# Patient Record
Sex: MALE | Race: WHITE | NOT HISPANIC OR LATINO | Employment: FULL TIME | URBAN - METROPOLITAN AREA
[De-identification: names, ages, dates, MRNs, and addresses within clinical notes are randomized per-mention and may not be internally consistent; named-entity substitution may affect disease eponyms.]

---

## 2017-02-03 ENCOUNTER — HISTORICAL (OUTPATIENT)
Dept: LAB | Facility: HOSPITAL | Age: 32
End: 2017-02-03

## 2017-02-03 LAB
CRP SERPL HS-MCNC: 2.02 MG/L (ref 0–3)
ERYTHROCYTE [SEDIMENTATION RATE] IN BLOOD: 2 MM/HR (ref 0–20)
RHEUMATOID FACT SERPL-ACNC: <10 IU/ML (ref 0–15)

## 2017-03-15 ENCOUNTER — HISTORICAL (OUTPATIENT)
Dept: LAB | Facility: HOSPITAL | Age: 32
End: 2017-03-15

## 2017-04-05 ENCOUNTER — HISTORICAL (OUTPATIENT)
Dept: LAB | Facility: HOSPITAL | Age: 32
End: 2017-04-05

## 2017-04-05 LAB
PTH-INTACT SERPL-MCNC: 47.6 PG/DL (ref 14–72)
RHEUMATOID FACT SERPL-ACNC: <10 IU/ML (ref 0–15)

## 2017-10-09 ENCOUNTER — HISTORICAL (OUTPATIENT)
Dept: LAB | Facility: HOSPITAL | Age: 32
End: 2017-10-09

## 2018-03-28 ENCOUNTER — HISTORICAL (OUTPATIENT)
Dept: ADMINISTRATIVE | Facility: HOSPITAL | Age: 33
End: 2018-03-28

## 2018-03-28 LAB
ALBUMIN SERPL-MCNC: 4.7 GM/DL (ref 3.4–5)
ALBUMIN/GLOB SERPL: 1.81 {RATIO} (ref 1.5–2.5)
ALP SERPL-CCNC: 79 UNIT/L (ref 38–126)
ALT SERPL-CCNC: 25 UNIT/L (ref 7–52)
AST SERPL-CCNC: 18 UNIT/L (ref 15–37)
BILIRUB SERPL-MCNC: 0.4 MG/DL (ref 0.2–1)
BILIRUBIN DIRECT+TOT PNL SERPL-MCNC: 0.1 MG/DL (ref 0–0.5)
BUN SERPL-MCNC: 10 MG/DL (ref 7–18)
CALCIUM SERPL-MCNC: 9.3 MG/DL (ref 8.5–10)
CHLORIDE SERPL-SCNC: 106 MMOL/L (ref 98–107)
CO2 SERPL-SCNC: 28 MMOL/L (ref 21–32)
CREAT SERPL-MCNC: 0.99 MG/DL (ref 0.6–1.3)
CREAT/UREA NIT SERPL: 10.1
GGT SERPL-CCNC: 70 UNIT/L (ref 5–85)
GLOBULIN SER-MCNC: 2.6 GM/DL (ref 1.2–3)
GLUCOSE SERPL-MCNC: 111 MG/DL (ref 74–106)
LDH SERPL-CCNC: 133 UNIT/L (ref 140–271)
POTASSIUM SERPL-SCNC: 4.1 MMOL/L (ref 3.5–5.1)
PROT SERPL-MCNC: 7.3 GM/DL (ref 6.4–8.2)
SODIUM SERPL-SCNC: 139 MMOL/L (ref 136–145)
T3FREE SERPL-MCNC: 3 PG/ML (ref 1.45–3.48)
T4 FREE SERPL-MCNC: 0.8 NG/DL (ref 0.76–1.46)
TSH SERPL-ACNC: 0.62 MIU/ML (ref 0.35–4.94)
URATE SERPL-MCNC: 6.8 MG/DL (ref 2.6–7.2)

## 2018-07-23 ENCOUNTER — HISTORICAL (OUTPATIENT)
Dept: ADMINISTRATIVE | Facility: HOSPITAL | Age: 33
End: 2018-07-23

## 2018-07-23 LAB
APPEARANCE, UA: CLEAR
BACTERIA #/AREA URNS AUTO: NORMAL /HPF
BILIRUB UR QL STRIP: NEGATIVE MG/DL
COLOR UR: YELLOW
GLUCOSE (UA): NEGATIVE MG/DL
HGB UR QL STRIP: NEGATIVE UNIT/L
KETONES UR QL STRIP: NEGATIVE MG/DL
LEUKOCYTE ESTERASE UR QL STRIP: NEGATIVE UNIT/L
NITRITE UR QL STRIP.AUTO: NEGATIVE
PH UR STRIP: 5.5 [PH]
PROT UR QL STRIP: NEGATIVE MG/DL
RBC #/AREA URNS HPF: NORMAL /HPF
SP GR UR STRIP: 1.01
SQUAMOUS EPITHELIAL, UA: NORMAL /LPF
UROBILINOGEN UR STRIP-ACNC: 0.2 MG/DL
WBC #/AREA URNS AUTO: NORMAL /[HPF]

## 2018-09-07 ENCOUNTER — HISTORICAL (OUTPATIENT)
Dept: ADMINISTRATIVE | Facility: HOSPITAL | Age: 33
End: 2018-09-07

## 2018-09-07 LAB
ABS NEUT (OLG): 2.2
ALBUMIN SERPL-MCNC: 4.5 GM/DL (ref 3.4–5)
ALBUMIN/GLOB SERPL: 1.67 {RATIO} (ref 1.5–2.5)
ALP SERPL-CCNC: 63 UNIT/L (ref 38–126)
ALT SERPL-CCNC: 52 UNIT/L (ref 7–52)
APPEARANCE, UA: CLEAR
AST SERPL-CCNC: 27 UNIT/L (ref 15–37)
BACTERIA #/AREA URNS AUTO: NORMAL /HPF
BILIRUB SERPL-MCNC: 0.4 MG/DL (ref 0.2–1)
BILIRUB UR QL STRIP: NEGATIVE MG/DL
BILIRUBIN DIRECT+TOT PNL SERPL-MCNC: 0.1 MG/DL (ref 0–0.5)
BILIRUBIN DIRECT+TOT PNL SERPL-MCNC: 0.3 MG/DL
BUN SERPL-MCNC: 10 MG/DL (ref 7–18)
CALCIUM SERPL-MCNC: 9.1 MG/DL (ref 8.5–10)
CHLORIDE SERPL-SCNC: 102 MMOL/L (ref 98–107)
CHOLEST SERPL-MCNC: 198 MG/DL (ref 0–200)
CHOLEST/HDLC SERPL: 3.1 {RATIO}
CO2 SERPL-SCNC: 30 MMOL/L (ref 21–32)
COLOR UR: YELLOW
CREAT SERPL-MCNC: 0.9 MG/DL (ref 0.6–1.3)
DEPRECATED CALCIDIOL+CALCIFEROL SERPL-MC: 28.2 NG/ML (ref 30–80)
ERYTHROCYTE [DISTWIDTH] IN BLOOD BY AUTOMATED COUNT: 12.3 % (ref 11.5–17)
GLOBULIN SER-MCNC: 2.7 GM/DL (ref 1.2–3)
GLUCOSE (UA): NEGATIVE MG/DL
GLUCOSE SERPL-MCNC: 110 MG/DL (ref 74–106)
HCT VFR BLD AUTO: 44.5 % (ref 42–52)
HDLC SERPL-MCNC: 64 MG/DL (ref 35–60)
HGB BLD-MCNC: 15 GM/DL (ref 14–18)
HGB UR QL STRIP: NEGATIVE UNIT/L
KETONES UR QL STRIP: NEGATIVE MG/DL
LDLC SERPL CALC-MCNC: 135 MG/DL (ref 0–129)
LEUKOCYTE ESTERASE UR QL STRIP: NEGATIVE UNIT/L
LYMPHOCYTES # BLD AUTO: 1.3 X10(3)/MCL (ref 0.6–3.4)
LYMPHOCYTES NFR BLD AUTO: 32.7 % (ref 13–40)
MCH RBC QN AUTO: 31.4 PG (ref 27–31.2)
MCHC RBC AUTO-ENTMCNC: 34 GM/DL (ref 32–36)
MCV RBC AUTO: 93 FL (ref 80–94)
MONOCYTES # BLD AUTO: 0.5 X10(3)/MCL (ref 0–1.8)
MONOCYTES NFR BLD AUTO: 12.6 % (ref 0.1–24)
NEUTROPHILS NFR BLD AUTO: 54.7 % (ref 47–80)
NITRITE UR QL STRIP.AUTO: NEGATIVE
PH UR STRIP: 7 [PH]
PLATELET # BLD AUTO: 251 X10(3)/MCL (ref 130–400)
PMV BLD AUTO: 8.8 FL
POTASSIUM SERPL-SCNC: 4.4 MMOL/L (ref 3.5–5.1)
PROT SERPL-MCNC: 7.2 GM/DL (ref 6.4–8.2)
PROT UR QL STRIP: NEGATIVE MG/DL
PSA SERPL-MCNC: 1.47 NG/ML (ref 0–2.5)
RBC # BLD AUTO: 4.77 X10(6)/MCL (ref 4.7–6.1)
RBC #/AREA URNS HPF: NORMAL /HPF
SODIUM SERPL-SCNC: 136 MMOL/L (ref 136–145)
SP GR UR STRIP: 1.01
SQUAMOUS EPITHELIAL, UA: NORMAL /LPF
T3FREE SERPL-MCNC: 3.92 PG/ML (ref 1.45–3.48)
T4 FREE SERPL-MCNC: 0.79 NG/DL (ref 0.76–1.46)
TESTOST SERPL-MCNC: 799 NG/DL (ref 300–1060)
TRIGL SERPL-MCNC: 57 MG/DL (ref 30–150)
TSH SERPL-ACNC: 1.77 MIU/ML (ref 0.35–4.94)
UROBILINOGEN UR STRIP-ACNC: 0.2 MG/DL
VLDLC SERPL CALC-MCNC: 11.4 MG/DL
WBC # SPEC AUTO: 4 X10(3)/MCL (ref 4.5–11.5)
WBC #/AREA URNS AUTO: NORMAL /[HPF]

## 2018-12-05 ENCOUNTER — HISTORICAL (OUTPATIENT)
Dept: ADMINISTRATIVE | Facility: HOSPITAL | Age: 33
End: 2018-12-05

## 2018-12-05 ENCOUNTER — HISTORICAL (OUTPATIENT)
Dept: LAB | Facility: HOSPITAL | Age: 33
End: 2018-12-05

## 2018-12-05 LAB
ABS NEUT (OLG): 4.1 X10(3)/MCL (ref 2.1–9.2)
ERYTHROCYTE [DISTWIDTH] IN BLOOD BY AUTOMATED COUNT: 12.2 % (ref 11.5–17)
HCT VFR BLD AUTO: 48.1 % (ref 42–52)
HGB BLD-MCNC: 15.2 GM/DL (ref 14–18)
LYMPHOCYTES # BLD AUTO: 2.1 X10(3)/MCL (ref 0.6–3.4)
LYMPHOCYTES NFR BLD AUTO: 30.2 % (ref 13–40)
MCH RBC QN AUTO: 30.6 PG (ref 27–31.2)
MCHC RBC AUTO-ENTMCNC: 32 GM/DL (ref 32–36)
MCV RBC AUTO: 97 FL (ref 80–94)
MONOCYTES # BLD AUTO: 0.6 X10(3)/MCL (ref 0.1–1.3)
MONOCYTES NFR BLD AUTO: 9.3 % (ref 0.1–24)
NEUTROPHILS NFR BLD AUTO: 60.5 % (ref 47–80)
PLATELET # BLD AUTO: 278 X10(3)/MCL (ref 130–400)
PMV BLD AUTO: 8.9 FL (ref 9.4–12.4)
RBC # BLD AUTO: 4.96 X10(6)/MCL (ref 4.7–6.1)
WBC # SPEC AUTO: 6.8 X10(3)/MCL (ref 4.5–11.5)

## 2019-01-17 ENCOUNTER — HISTORICAL (OUTPATIENT)
Dept: LAB | Facility: HOSPITAL | Age: 34
End: 2019-01-17

## 2019-01-17 LAB — LIPASE SERPL-CCNC: 1077 UNIT/L (ref 73–393)

## 2019-03-15 ENCOUNTER — HISTORICAL (OUTPATIENT)
Dept: LAB | Facility: HOSPITAL | Age: 34
End: 2019-03-15

## 2019-03-15 ENCOUNTER — HISTORICAL (OUTPATIENT)
Dept: ADMINISTRATIVE | Facility: HOSPITAL | Age: 34
End: 2019-03-15

## 2019-03-15 LAB
LIPASE SERPL-CCNC: 1376 UNIT/L (ref 73–393)
T3FREE SERPL-MCNC: 3.9 PG/ML (ref 1.45–3.48)
T4 FREE SERPL-MCNC: 0.93 NG/DL (ref 0.76–1.46)
TSH SERPL-ACNC: 0.27 MIU/ML (ref 0.35–4.94)

## 2019-07-09 ENCOUNTER — HISTORICAL (OUTPATIENT)
Dept: LAB | Facility: HOSPITAL | Age: 34
End: 2019-07-09

## 2019-07-09 LAB
ABS NEUT (OLG): 4.67 X10(3)/MCL (ref 2.1–9.2)
ALBUMIN SERPL-MCNC: 4 GM/DL (ref 3.4–5)
ALBUMIN/GLOB SERPL: 1.3 {RATIO}
ALP SERPL-CCNC: 61 UNIT/L (ref 45–117)
ALT SERPL-CCNC: 29 UNIT/L (ref 16–61)
AMYLASE SERPL-CCNC: 150 UNIT/L (ref 25–115)
AST SERPL-CCNC: 17 UNIT/L (ref 15–37)
BASOPHILS # BLD AUTO: 0.04 X10(3)/MCL (ref 0–0.2)
BASOPHILS NFR BLD AUTO: 0.5 % (ref 0–0.9)
BILIRUB SERPL-MCNC: 0.2 MG/DL (ref 0.2–1)
BILIRUBIN DIRECT+TOT PNL SERPL-MCNC: <0.1 MG/DL (ref 0–0.2)
BILIRUBIN DIRECT+TOT PNL SERPL-MCNC: >0.1 MG/DL (ref 0–1)
BUN SERPL-MCNC: 12 MG/DL (ref 7–18)
CALCIUM SERPL-MCNC: 9.1 MG/DL (ref 8.5–10.1)
CHLORIDE SERPL-SCNC: 108 MMOL/L (ref 98–107)
CO2 SERPL-SCNC: 28 MMOL/L (ref 21–32)
CREAT SERPL-MCNC: 1.03 MG/DL (ref 0.7–1.3)
EOSINOPHIL # BLD AUTO: 0.18 X10(3)/MCL (ref 0–0.9)
EOSINOPHIL NFR BLD AUTO: 2.4 % (ref 0–6.5)
ERYTHROCYTE [DISTWIDTH] IN BLOOD BY AUTOMATED COUNT: 13.2 % (ref 11.5–17)
GLOBULIN SER-MCNC: 3 GM/DL (ref 2–4)
GLUCOSE SERPL-MCNC: 88 MG/DL (ref 74–106)
HCT VFR BLD AUTO: 43.9 % (ref 42–52)
HGB BLD-MCNC: 14.5 GM/DL (ref 14–18)
IMM GRANULOCYTES # BLD AUTO: 0.01 10*3/UL (ref 0–0.02)
IMM GRANULOCYTES NFR BLD AUTO: 0.1 % (ref 0–0.43)
LIPASE SERPL-CCNC: 1002 UNIT/L (ref 73–393)
LYMPHOCYTES # BLD AUTO: 1.99 X10(3)/MCL (ref 0.6–4.6)
LYMPHOCYTES NFR BLD AUTO: 26.9 % (ref 16.2–38.3)
MCH RBC QN AUTO: 30.8 PG (ref 27–31)
MCHC RBC AUTO-ENTMCNC: 33 GM/DL (ref 33–36)
MCV RBC AUTO: 93.2 FL (ref 80–94)
MONOCYTES # BLD AUTO: 0.52 X10(3)/MCL (ref 0.1–1.3)
MONOCYTES NFR BLD AUTO: 7 % (ref 4.7–11.3)
NEUTROPHILS # BLD AUTO: 4.67 X10(3)/MCL (ref 2.1–9.2)
NEUTROPHILS NFR BLD AUTO: 63.1 % (ref 49.1–73.4)
NRBC BLD AUTO-RTO: 0 % (ref 0–0.2)
PLATELET # BLD AUTO: 255 X10(3)/MCL (ref 130–400)
PMV BLD AUTO: 9.5 FL (ref 7.4–10.4)
POTASSIUM SERPL-SCNC: 4.9 MMOL/L (ref 3.5–5.1)
PROT SERPL-MCNC: 7.2 GM/DL (ref 6.4–8.2)
RBC # BLD AUTO: 4.71 X10(6)/MCL (ref 4.7–6.1)
SODIUM SERPL-SCNC: 141 MMOL/L (ref 136–145)
WBC # SPEC AUTO: 7.4 X10(3)/MCL (ref 4.5–11.5)

## 2019-11-20 ENCOUNTER — HISTORICAL (OUTPATIENT)
Dept: LAB | Facility: HOSPITAL | Age: 34
End: 2019-11-20

## 2019-11-20 ENCOUNTER — HISTORICAL (OUTPATIENT)
Dept: ADMINISTRATIVE | Facility: HOSPITAL | Age: 34
End: 2019-11-20

## 2019-11-20 LAB
ALBUMIN SERPL-MCNC: 4.7 GM/DL (ref 3.4–5)
ALBUMIN/GLOB SERPL: 2.04 {RATIO} (ref 1.5–2.5)
ALP SERPL-CCNC: 53 UNIT/L (ref 38–126)
ALT SERPL-CCNC: 18 UNIT/L (ref 7–52)
AMYLASE SERPL-CCNC: 69 UNIT/L (ref 25–115)
AST SERPL-CCNC: 16 UNIT/L (ref 15–37)
BILIRUB SERPL-MCNC: 0.5 MG/DL (ref 0.2–1)
BILIRUBIN DIRECT+TOT PNL SERPL-MCNC: 0.1 MG/DL (ref 0–0.5)
BILIRUBIN DIRECT+TOT PNL SERPL-MCNC: 0.4 MG/DL
BUN SERPL-MCNC: 6 MG/DL (ref 7–18)
CALCIUM SERPL-MCNC: 9.5 MG/DL (ref 8.5–10)
CHLORIDE SERPL-SCNC: 100 MMOL/L (ref 98–107)
CO2 SERPL-SCNC: 33 MMOL/L (ref 21–32)
CREAT SERPL-MCNC: 0.95 MG/DL (ref 0.6–1.3)
GLOBULIN SER-MCNC: 2.3 GM/DL (ref 1.2–3)
GLUCOSE SERPL-MCNC: 111 MG/DL (ref 74–106)
LIPASE SERPL-CCNC: 131 UNIT/L (ref 73–393)
POTASSIUM SERPL-SCNC: 4.5 MMOL/L (ref 3.5–5.1)
PROT SERPL-MCNC: 7 GM/DL (ref 6.4–8.2)
SODIUM SERPL-SCNC: 137 MMOL/L (ref 136–145)

## 2020-07-21 ENCOUNTER — HISTORICAL (OUTPATIENT)
Dept: ADMINISTRATIVE | Facility: HOSPITAL | Age: 35
End: 2020-07-21

## 2020-07-21 LAB
ABS NEUT (OLG): 2.5 X10(3)/MCL (ref 2.1–9.2)
ALBUMIN SERPL-MCNC: 4.8 GM/DL (ref 3.4–5)
ALBUMIN/GLOB SERPL: 2 {RATIO} (ref 1.5–2.5)
ALP SERPL-CCNC: 65 UNIT/L (ref 38–126)
ALT SERPL-CCNC: 35 UNIT/L (ref 7–52)
AST SERPL-CCNC: 19 UNIT/L (ref 15–37)
BILIRUB SERPL-MCNC: 0.3 MG/DL (ref 0.2–1)
BILIRUBIN DIRECT+TOT PNL SERPL-MCNC: 0.1 MG/DL (ref 0–0.5)
BILIRUBIN DIRECT+TOT PNL SERPL-MCNC: 0.2 MG/DL
BUN SERPL-MCNC: 16 MG/DL (ref 7–18)
CALCIUM SERPL-MCNC: 9.8 MG/DL (ref 8.5–10.1)
CHLORIDE SERPL-SCNC: 101 MMOL/L (ref 98–107)
CHOLEST SERPL-MCNC: 258 MG/DL (ref 0–200)
CHOLEST/HDLC SERPL: 3.5 {RATIO}
CO2 SERPL-SCNC: 31 MMOL/L (ref 21–32)
CREAT SERPL-MCNC: 1.18 MG/DL (ref 0.6–1.3)
ERYTHROCYTE [DISTWIDTH] IN BLOOD BY AUTOMATED COUNT: 12.2 % (ref 11.5–17)
GLOBULIN SER-MCNC: 2.4 GM/DL (ref 1.2–3)
GLUCOSE SERPL-MCNC: 111 MG/DL (ref 74–106)
HCT VFR BLD AUTO: 46.9 % (ref 42–52)
HDLC SERPL-MCNC: 73 MG/DL (ref 35–60)
HGB BLD-MCNC: 15.7 GM/DL (ref 14–18)
LDLC SERPL CALC-MCNC: 154 MG/DL (ref 0–129)
LYMPHOCYTES # BLD AUTO: 1.5 X10(3)/MCL (ref 0.6–3.4)
LYMPHOCYTES NFR BLD AUTO: 34.7 % (ref 13–40)
MCH RBC QN AUTO: 30 PG (ref 27–31.2)
MCHC RBC AUTO-ENTMCNC: 34 GM/DL (ref 32–36)
MCV RBC AUTO: 90 FL (ref 80–94)
MONOCYTES # BLD AUTO: 0.4 X10(3)/MCL (ref 0.1–1.3)
MONOCYTES NFR BLD AUTO: 8.4 % (ref 0.1–24)
NEUTROPHILS NFR BLD AUTO: 56.9 % (ref 47–80)
PLATELET # BLD AUTO: 275 X10(3)/MCL (ref 130–400)
PMV BLD AUTO: 9.1 FL (ref 9.4–12.4)
POTASSIUM SERPL-SCNC: 4.9 MMOL/L (ref 3.5–5.1)
PROT SERPL-MCNC: 7.2 GM/DL (ref 6.4–8.2)
RBC # BLD AUTO: 5.23 X10(6)/MCL (ref 4.7–6.1)
SODIUM SERPL-SCNC: 137 MMOL/L (ref 136–145)
T3FREE SERPL-MCNC: 3.13 PG/ML (ref 1.45–3.48)
T4 FREE SERPL-MCNC: 0.78 NG/DL (ref 0.76–1.46)
TRIGL SERPL-MCNC: 67 MG/DL (ref 30–150)
TSH SERPL-ACNC: 0.53 MIU/ML (ref 0.35–4.94)
VLDLC SERPL CALC-MCNC: 13.4 MG/DL
WBC # SPEC AUTO: 4.4 X10(3)/MCL (ref 4.5–11.5)

## 2020-11-10 ENCOUNTER — HISTORICAL (OUTPATIENT)
Dept: ADMINISTRATIVE | Facility: HOSPITAL | Age: 35
End: 2020-11-10

## 2020-11-10 LAB
ALBUMIN SERPL-MCNC: 4.6 GM/DL (ref 3.4–5)
ALBUMIN/GLOB SERPL: 1.92 {RATIO} (ref 1.5–2.5)
ALP SERPL-CCNC: 57 UNIT/L (ref 38–126)
ALT SERPL-CCNC: 28 UNIT/L (ref 7–52)
AST SERPL-CCNC: 22 UNIT/L (ref 15–37)
BILIRUB SERPL-MCNC: 0.4 MG/DL (ref 0.2–1)
BILIRUBIN DIRECT+TOT PNL SERPL-MCNC: 0.1 MG/DL (ref 0–0.5)
BILIRUBIN DIRECT+TOT PNL SERPL-MCNC: 0.3 MG/DL
BUN SERPL-MCNC: 17 MG/DL (ref 7–18)
CALCIUM SERPL-MCNC: 9.4 MG/DL (ref 8.5–10.1)
CHLORIDE SERPL-SCNC: 105 MMOL/L (ref 98–107)
CO2 SERPL-SCNC: 27 MMOL/L (ref 21–32)
CREAT SERPL-MCNC: 1.12 MG/DL (ref 0.6–1.3)
GLOBULIN SER-MCNC: 2.4 GM/DL (ref 1.2–3)
GLUCOSE SERPL-MCNC: 100 MG/DL (ref 74–106)
H PYLORI AB SER IA-ACNC: NEGATIVE
LIPASE SERPL-CCNC: 215 U/L
POTASSIUM SERPL-SCNC: 4.7 MMOL/L (ref 3.5–5.1)
PROT SERPL-MCNC: 7 GM/DL (ref 6.4–8.2)
SODIUM SERPL-SCNC: 140 MMOL/L (ref 136–145)
T3FREE SERPL-MCNC: 3.01 PG/ML (ref 1.45–3.48)
T4 FREE SERPL-MCNC: 0.75 NG/DL (ref 0.76–1.46)
TSH SERPL-ACNC: 1.25 MIU/ML (ref 0.35–4.94)

## 2021-01-18 ENCOUNTER — HISTORICAL (OUTPATIENT)
Dept: RADIOLOGY | Facility: HOSPITAL | Age: 36
End: 2021-01-18

## 2021-10-07 ENCOUNTER — HISTORICAL (OUTPATIENT)
Dept: ADMINISTRATIVE | Facility: HOSPITAL | Age: 36
End: 2021-10-07

## 2021-10-07 LAB
ABS NEUT (OLG): 3.3 X10(3)/MCL (ref 2.1–9.2)
ALBUMIN SERPL-MCNC: 4.8 GM/DL (ref 3.4–5)
ALBUMIN/GLOB SERPL: 2 {RATIO} (ref 1.5–2.5)
ALP SERPL-CCNC: 42 UNIT/L (ref 38–126)
ALT SERPL-CCNC: 33 UNIT/L (ref 7–52)
AST SERPL-CCNC: 21 UNIT/L (ref 15–37)
BILIRUB SERPL-MCNC: 0.5 MG/DL (ref 0.2–1)
BILIRUBIN DIRECT+TOT PNL SERPL-MCNC: 0.1 MG/DL (ref 0–0.5)
BILIRUBIN DIRECT+TOT PNL SERPL-MCNC: 0.4 MG/DL
BUN SERPL-MCNC: 17 MG/DL (ref 7–18)
CALCIUM SERPL-MCNC: 9.6 MG/DL (ref 8.5–10.1)
CHLORIDE SERPL-SCNC: 98 MMOL/L (ref 98–107)
CHOLEST SERPL-MCNC: 276 MG/DL (ref 0–200)
CHOLEST/HDLC SERPL: 3.3 {RATIO}
CO2 SERPL-SCNC: 30 MMOL/L (ref 21–32)
CREAT SERPL-MCNC: 0.97 MG/DL (ref 0.6–1.3)
DEPRECATED CALCIDIOL+CALCIFEROL SERPL-MC: 35.8 NG/ML (ref 30–80)
ERYTHROCYTE [DISTWIDTH] IN BLOOD BY AUTOMATED COUNT: 12.6 % (ref 11.5–17)
EST. AVERAGE GLUCOSE BLD GHB EST-MCNC: 100 MG/DL
GLOBULIN SER-MCNC: 2.4 GM/DL (ref 1.2–3)
GLUCOSE SERPL-MCNC: 93 MG/DL (ref 74–106)
HBA1C MFR BLD: 5.1 % (ref 4.4–6.4)
HCT VFR BLD AUTO: 44.9 % (ref 42–52)
HDLC SERPL-MCNC: 83 MG/DL (ref 35–60)
HGB BLD-MCNC: 14.8 GM/DL (ref 14–18)
LDLC SERPL CALC-MCNC: 151 MG/DL (ref 0–129)
LYMPHOCYTES # BLD AUTO: 1.8 X10(3)/MCL (ref 0.6–3.4)
LYMPHOCYTES NFR BLD AUTO: 31.7 % (ref 13–40)
MCH RBC QN AUTO: 30.5 PG (ref 27–31.2)
MCHC RBC AUTO-ENTMCNC: 33 GM/DL (ref 32–36)
MCV RBC AUTO: 93 FL (ref 80–94)
MONOCYTES # BLD AUTO: 0.5 X10(3)/MCL (ref 0.1–1.3)
MONOCYTES NFR BLD AUTO: 9.1 % (ref 0.1–24)
NEUTROPHILS NFR BLD AUTO: 59.2 % (ref 47–80)
PLATELET # BLD AUTO: 272 X10(3)/MCL (ref 130–400)
PMV BLD AUTO: 8.8 FL (ref 9.4–12.4)
POTASSIUM SERPL-SCNC: 4.6 MMOL/L (ref 3.5–5.1)
PROT SERPL-MCNC: 7.2 GM/DL (ref 6.4–8.2)
RBC # BLD AUTO: 4.85 X10(6)/MCL (ref 4.7–6.1)
SODIUM SERPL-SCNC: 136 MMOL/L (ref 136–145)
T4 FREE SERPL-MCNC: 0.88 NG/DL (ref 0.76–1.46)
TESTOST SERPL-MCNC: >1009 NG/DL (ref 300–1060)
TRIGL SERPL-MCNC: 62 MG/DL (ref 30–150)
TSH SERPL-ACNC: 3.71 MIU/ML (ref 0.35–4.94)
URATE SERPL-MCNC: 5.6 MG/DL (ref 2.6–7.2)
VLDLC SERPL CALC-MCNC: 12.4 MG/DL
WBC # SPEC AUTO: 5.6 X10(3)/MCL (ref 4.5–11.5)

## 2021-11-16 ENCOUNTER — HISTORICAL (OUTPATIENT)
Dept: ADMINISTRATIVE | Facility: HOSPITAL | Age: 36
End: 2021-11-16

## 2021-11-30 ENCOUNTER — HISTORICAL (OUTPATIENT)
Dept: ADMINISTRATIVE | Facility: HOSPITAL | Age: 36
End: 2021-11-30

## 2021-11-30 LAB
ABS NEUT (OLG): 6.3 X10(3)/MCL (ref 2.1–9.2)
ERYTHROCYTE [DISTWIDTH] IN BLOOD BY AUTOMATED COUNT: 12.4 % (ref 11.5–17)
ERYTHROCYTE [SEDIMENTATION RATE] IN BLOOD: 2 MM/HR (ref 0–15)
HCT VFR BLD AUTO: 43.6 % (ref 42–52)
HGB BLD-MCNC: 14.3 GM/DL (ref 14–18)
LYMPHOCYTES # BLD AUTO: 0.7 X10(3)/MCL (ref 0.6–3.4)
LYMPHOCYTES NFR BLD AUTO: 10 % (ref 13–40)
MCH RBC QN AUTO: 30.2 PG (ref 27–31.2)
MCHC RBC AUTO-ENTMCNC: 33 GM/DL (ref 32–36)
MCV RBC AUTO: 92 FL (ref 80–94)
MONOCYTES # BLD AUTO: 0.1 X10(3)/MCL (ref 0.1–1.3)
MONOCYTES NFR BLD AUTO: 1.9 % (ref 0.1–24)
NEUTROPHILS NFR BLD AUTO: 88.1 % (ref 47–80)
PLATELET # BLD AUTO: 250 X10(3)/MCL (ref 130–400)
PMV BLD AUTO: 9.7 FL (ref 9.4–12.4)
RBC # BLD AUTO: 4.74 X10(6)/MCL (ref 4.7–6.1)
URATE SERPL-MCNC: 5.4 MG/DL (ref 2.6–7.2)
WBC # SPEC AUTO: 7.1 X10(3)/MCL (ref 4.5–11.5)

## 2022-03-30 DIAGNOSIS — G62.9 POLYNEUROPATHY: Primary | ICD-10-CM

## 2022-04-10 ENCOUNTER — HISTORICAL (OUTPATIENT)
Dept: ADMINISTRATIVE | Facility: HOSPITAL | Age: 37
End: 2022-04-10
Payer: COMMERCIAL

## 2022-04-26 VITALS
BODY MASS INDEX: 25.46 KG/M2 | OXYGEN SATURATION: 99 % | SYSTOLIC BLOOD PRESSURE: 126 MMHG | WEIGHT: 168 LBS | DIASTOLIC BLOOD PRESSURE: 74 MMHG | HEIGHT: 68 IN

## 2022-09-14 DIAGNOSIS — M54.2 CERVICAL PAIN: Primary | ICD-10-CM

## 2022-09-21 ENCOUNTER — TELEPHONE (OUTPATIENT)
Dept: NEUROSURGERY | Facility: CLINIC | Age: 37
End: 2022-09-21
Payer: COMMERCIAL

## 2022-09-21 DIAGNOSIS — M54.2 CERVICAL PAIN: Primary | ICD-10-CM

## 2022-09-21 NOTE — TELEPHONE ENCOUNTER
I called the patient and sched an appt for 10/13/22 @ 11:00 w/ XR prior at 9:45am. Patient will get a copy of disc with MRI images from [facility] to bring to his appt. He verbalized understanding and was compliant with appt date and time.

## 2022-10-10 PROBLEM — E55.9 VITAMIN D DEFICIENCY: Status: ACTIVE | Noted: 2022-10-10

## 2022-10-10 PROBLEM — F41.1 GENERALIZED ANXIETY DISORDER: Status: ACTIVE | Noted: 2022-10-10

## 2022-10-10 PROBLEM — F90.9 ATTENTION DEFICIT HYPERACTIVITY DISORDER (ADHD): Status: ACTIVE | Noted: 2022-10-10

## 2022-10-10 PROBLEM — N52.9 ERECTILE DYSFUNCTION: Status: ACTIVE | Noted: 2022-10-10

## 2022-10-10 PROBLEM — Z72.0 TOBACCO USER: Status: ACTIVE | Noted: 2022-10-10

## 2022-10-10 PROBLEM — F42.9 OBSESSIVE-COMPULSIVE DISORDER: Status: ACTIVE | Noted: 2022-10-10

## 2022-10-10 PROBLEM — G90.9 DISORDER OF AUTONOMIC NERVOUS SYSTEM: Status: ACTIVE | Noted: 2022-10-10

## 2022-10-10 PROBLEM — M25.50 ARTHRALGIA: Status: ACTIVE | Noted: 2022-10-10

## 2022-10-10 PROBLEM — D89.84 IGG4 RELATED DISEASE: Status: ACTIVE | Noted: 2022-10-10

## 2022-10-10 PROBLEM — M10.9 GOUT: Status: ACTIVE | Noted: 2022-10-10

## 2022-10-10 PROBLEM — F31.9 BIPOLAR I DISORDER: Status: ACTIVE | Noted: 2022-10-10

## 2022-10-10 PROBLEM — B02.29 POSTHERPETIC NEURALGIA: Status: ACTIVE | Noted: 2022-10-10

## 2022-10-10 PROBLEM — E03.9 HYPOTHYROIDISM: Status: ACTIVE | Noted: 2022-10-10

## 2022-10-10 PROBLEM — Z00.00 WELLNESS EXAMINATION: Status: ACTIVE | Noted: 2022-10-10

## 2022-10-10 PROBLEM — K21.9 GASTROESOPHAGEAL REFLUX DISEASE: Status: ACTIVE | Noted: 2022-10-10

## 2022-10-13 ENCOUNTER — HOSPITAL ENCOUNTER (OUTPATIENT)
Dept: RADIOLOGY | Facility: HOSPITAL | Age: 37
Discharge: HOME | End: 2022-10-13
Attending: PHYSICIAN ASSISTANT
Payer: COMMERCIAL

## 2022-10-13 ENCOUNTER — OFFICE VISIT (OUTPATIENT)
Dept: NEUROSURGERY | Facility: CLINIC | Age: 37
End: 2022-10-13
Payer: COMMERCIAL

## 2022-10-13 VITALS
DIASTOLIC BLOOD PRESSURE: 73 MMHG | RESPIRATION RATE: 16 BRPM | HEIGHT: 67 IN | BODY MASS INDEX: 25.11 KG/M2 | HEART RATE: 91 BPM | WEIGHT: 160 LBS | SYSTOLIC BLOOD PRESSURE: 111 MMHG

## 2022-10-13 DIAGNOSIS — M54.2 CERVICAL PAIN: ICD-10-CM

## 2022-10-13 DIAGNOSIS — M48.02 CERVICAL STENOSIS OF SPINAL CANAL: ICD-10-CM

## 2022-10-13 DIAGNOSIS — M47.22 CERVICAL SPONDYLOSIS WITH RADICULOPATHY: Primary | ICD-10-CM

## 2022-10-13 PROCEDURE — 1160F PR REVIEW ALL MEDS BY PRESCRIBER/CLIN PHARMACIST DOCUMENTED: ICD-10-PCS | Mod: CPTII,,, | Performed by: PHYSICIAN ASSISTANT

## 2022-10-13 PROCEDURE — 3078F DIAST BP <80 MM HG: CPT | Mod: CPTII,,, | Performed by: PHYSICIAN ASSISTANT

## 2022-10-13 PROCEDURE — 99204 PR OFFICE/OUTPT VISIT, NEW, LEVL IV, 45-59 MIN: ICD-10-PCS | Mod: ,,, | Performed by: PHYSICIAN ASSISTANT

## 2022-10-13 PROCEDURE — 72052 X-RAY EXAM NECK SPINE 6/>VWS: CPT | Mod: TC

## 2022-10-13 PROCEDURE — 1159F MED LIST DOCD IN RCRD: CPT | Mod: CPTII,,, | Performed by: PHYSICIAN ASSISTANT

## 2022-10-13 PROCEDURE — 3074F SYST BP LT 130 MM HG: CPT | Mod: CPTII,,, | Performed by: PHYSICIAN ASSISTANT

## 2022-10-13 PROCEDURE — 1159F PR MEDICATION LIST DOCUMENTED IN MEDICAL RECORD: ICD-10-PCS | Mod: CPTII,,, | Performed by: PHYSICIAN ASSISTANT

## 2022-10-13 PROCEDURE — 3074F PR MOST RECENT SYSTOLIC BLOOD PRESSURE < 130 MM HG: ICD-10-PCS | Mod: CPTII,,, | Performed by: PHYSICIAN ASSISTANT

## 2022-10-13 PROCEDURE — 1160F RVW MEDS BY RX/DR IN RCRD: CPT | Mod: CPTII,,, | Performed by: PHYSICIAN ASSISTANT

## 2022-10-13 PROCEDURE — 99204 OFFICE O/P NEW MOD 45 MIN: CPT | Mod: ,,, | Performed by: PHYSICIAN ASSISTANT

## 2022-10-13 PROCEDURE — 3078F PR MOST RECENT DIASTOLIC BLOOD PRESSURE < 80 MM HG: ICD-10-PCS | Mod: CPTII,,, | Performed by: PHYSICIAN ASSISTANT

## 2022-10-13 NOTE — PROGRESS NOTES
[facility]  History & Physical  Neurosurgery      Farzad Lockwood   [id number]   1985       CHIEF COMPLAINT:  Neck and left arm pain    HPI:  Farzad Lockwood is a 37 y.o. right hand dominant male who presents for neurosurgical evaluation.  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  Four years ago, he began to have neck pain that was of insidious onset.  It has been a few years that he has had pain going into the left upper extremity.  Six months ago, he began with pain into the right arm.  His symptoms have been progressively worsening.    He complains of neck pain especially at the base of his neck.  There is pain radiating into the interscapular region, left greater than right trapezius muscles, shoulders, upper arms, dorsal forearms and into the 1st through 3rd fingers.  With cervical flexion he experiences pain going down the spine to the lower back and at times up into the head.  There is numbness and tingling in his arms and hands.  Activity and lifting increases his symptoms.  His symptoms are improved with heat especially dry heat in a sauna.  MRI of the cervical spine was obtained in January of 2022.  He has undergone injections with Dr. Sales which were not of significant benefit.  He underwent a course of physical therapy.  This was stopped due to an increase in his symptoms.  Massage has helped his symptoms for short period of time.  Overall, his symptoms are progressively worsening.  He is having to greatly restrict his activities secondary to pain.  He is only able to play the piano for 10 minutes.  He is not able to play stringed instruments due to pain.  His arms fatigue easily, and have a weird feeling while playing.  He feels he has exhausted conservative measures.  He reports to have difficulty initiating  flow with voiding.  He does not have disturbances in bowel function.  She does not have difficulty with his balance, but notes dizziness, lightheaded feeling when lying down.  He is seen today for neurosurgical evaluation and care.       Past Medical History:   Diagnosis Date    ADHD (attention deficit hyperactivity disorder)     Anxiety disorder, unspecified     Bipolar disorder, unspecified     Brachial neuritis     GERD (gastroesophageal reflux disease)     Gout, unspecified     Hypercholesteremia     Hypothyroidism, unspecified     Insomnia     Neuropathy        Past Surgical History:   Procedure Laterality Date    APPENDECTOMY  2007    CHOLECYSTECTOMY      Radioactive iodine-induced hypothyroidism      RHINOPLASTY         Family History   Problem Relation Age of Onset    Mitral valve prolapse Mother     Mitral valve prolapse Brother     Cancer Maternal Grandfather     Hypothyroidism Paternal Grandmother        Social History     Socioeconomic History    Marital status: Unknown   Tobacco Use    Smoking status: Never    Smokeless tobacco: Never       Medication List with Changes/Refills   Current Medications    ALLOPURINOL (ZYLOPRIM) 100 MG TABLET    Take 300 mg by mouth once daily.    ARMOUR THYROID 120 MG TAB    Take 1 tablet by mouth once daily.    ARMOUR THYROID 15 MG TAB    Take 1 tablet (15 mg total) by mouth once daily at 6am.    BACLOFEN (LIORESAL) 10 MG TABLET    TAKE 1 TABLET BY MOUTH TWICE A DAY FOR MUSCLE SPASMS    CLONAZEPAM (KLONOPIN) 0.5 MG TABLET    Take 0.5 mg by mouth 3 (three) times daily as needed.    DEXTROAMPHETAMINE-AMPHETAMINE (ADDERALL XR) 20 MG 24 HR CAPSULE    Take 1 capsule by mouth every morning.    LAMOTRIGINE (LAMICTAL) 200 MG TABLET    Take 200 mg by mouth once daily.    MELOXICAM (MOBIC) 15 MG TABLET    Take 1 tablet by mouth once daily.    NALTREXONE CAPSULE    Take 1 capsule (4.5 mg total) by mouth once daily.    OMEPRAZOLE (PRILOSEC) 40 MG CAPSULE    Take 1 capsule (40 mg  "total) by mouth once daily.    PREGABALIN (LYRICA) 75 MG CAPSULE    Take 1 capsule (75 mg total) by mouth 3 (three) times daily.    QUETIAPINE (SEROQUEL) 100 MG TAB    Take 1 tablet by mouth once daily at 6am.    TAMSULOSIN (FLOMAX) 0.4 MG CAP    TAKE 1 CAPSULE BY MOUTH ONCE DAILY        Review of patient's allergies indicates:   Allergen Reactions    Penicillins Hives    Tramadol      Other reaction(s): NIGHTMARES        Review of Systems   Constitutional:  Negative for chills and fever.   HENT:  Negative for congestion and hearing loss.    Eyes:  Negative for blurred vision and double vision.   Respiratory:  Negative for cough and wheezing.    Cardiovascular:  Negative for chest pain and palpitations.   Gastrointestinal:  Negative for nausea and vomiting.   Genitourinary:  Negative for urgency.   Musculoskeletal:  Positive for back pain and neck pain.   Skin:  Negative for rash.   Neurological:  Positive for dizziness, tingling, sensory change and headaches.   Psychiatric/Behavioral:  Negative for hallucinations.         Physical Examination:    Vital Signs:  /73 (BP Location: Right arm, Patient Position: Sitting)   Pulse 91   Resp 16   Ht 5' 7" (1.702 m)   Wt 72.6 kg (160 lb)   BMI 25.06 kg/m²        General:  Pleasant. Well-nourished. Well-groomed.    CV:  Neck is supple.  There are no carotid bruits.  Hear has regular rate and rhythm.    Lungs:  clear to auscultation bilaterally    Abdomen:  Soft, non-tender, non-distended    Musculoskeletal:  Cervical ROM:  Mildly limited with flexion.  Pain is increased with flexion.  Tinel's is negative at bilateral wrist and elbows.  Spurling's maneuver is negative bilaterally.    Neurological:    Oriented to Person, Place, Time   Muscle strength against resistance:  Strength  Deltoids Triceps Biceps Wrist Extension Wrist Flexion Hand    Upper: R 5/5 5/5 5/5 5/5 5/5 5/5    L 5/5 5/5 5/5 5/5 5/5 5/5   Sensation is intact to primary modalities in bilateral " upper extremities.  Reflexes:   Right Left   Triceps 2+ 2+   Biceps 2+ 2+   Brachioradialis 2+ 2+   Patellar 2+ 2+   Achilles 2+ 2+   Madsen's sign is positive bilaterally.  There is no clonus at the ankles.  Gait: normal  Coordination is normal      Imaging:  Cervical x-rays with flexion-extension views were obtained today, 10/13/2022.  This study shows disc degeneration and spondylosis at C5-6.  There may be slight retrolisthesis at C5-6 that reduces with flexion.  MRI of the cervical spine was obtained on 01/24/2022.  At C5-6, there is disc dehydration and broad-based disc herniation.  There is severe central and left foraminal with moderate right foraminal stenosis secondary to disc/osteophyte complex with at C6-7, there is disc/osteophyte complex along with uncinate and facet hypertrophy that causes moderate central and right foraminal with severe left foraminal stenosis.  uncinate and facet hypertrophy.      ASSESSMENT/PLAN:     1. Cervical spondylosis with radiculopathy  MRI Cervical Spine Without Contrast    DXA Bone Density Spine And Hip    DXA Bone Density Appendicular Skeleton      2. Cervical stenosis of spinal canal  MRI Cervical Spine Without Contrast        Options were discussed at length with the patient.  He is interested in surgical options due to the failure of conservative measures and how much his pain is affecting his daily life.  I believe he could possibly be appropriate for total disc replacement at C5-6 and C6-7.  His pain has worsen as well as there is development of new symptoms.  We will have him obtain updated cervical MRIas well as bone density study to confirm.  He will return for follow-up with that testing.  Risk, benefits, and possible complications were discussed.  Surgery is tentatively scheduled for 12/15/2022.      A total of 47 minutes was spent face-to-face with the patient during this encounter.  Over half of that time was spent on counseling and coordination of care.   Additional time was used to review the patient's chart, cervical MRI and x-ray imaging and reports, and work on office note.

## 2022-10-25 ENCOUNTER — TELEPHONE (OUTPATIENT)
Dept: NEUROSURGERY | Facility: CLINIC | Age: 37
End: 2022-10-25
Payer: COMMERCIAL

## 2022-10-25 DIAGNOSIS — M47.22 CERVICAL SPONDYLOSIS WITH RADICULOPATHY: Primary | ICD-10-CM

## 2022-10-25 NOTE — TELEPHONE ENCOUNTER
I discussed the patient and his situation with Dr. Denise.  Dr. Denise reviewed the new cervical MRI and x-rays.  He feels that C5-6 is the problem level.  He would like to see Cervical CT without contrast.    I called the patient to discuss.  He voiced understanding.  We will see him on 11/14/2022.

## 2022-10-28 ENCOUNTER — HOSPITAL ENCOUNTER (OUTPATIENT)
Dept: RADIOLOGY | Facility: HOSPITAL | Age: 37
Discharge: HOME | End: 2022-10-28
Attending: PHYSICIAN ASSISTANT
Payer: COMMERCIAL

## 2022-10-28 DIAGNOSIS — M47.22 CERVICAL SPONDYLOSIS WITH RADICULOPATHY: ICD-10-CM

## 2022-10-28 PROCEDURE — 77081 DXA BONE DENSITY APPENDICULR: CPT | Mod: 26,59,, | Performed by: RADIOLOGY

## 2022-10-28 PROCEDURE — 77081 DXA BONE DENSITY APPENDICULR: CPT | Mod: TC

## 2022-10-28 PROCEDURE — 77080 DXA BONE DENSITY AXIAL: CPT | Mod: TC

## 2022-10-28 PROCEDURE — 77080 DEXA BONE DENSITY SPINE HIP: ICD-10-PCS | Mod: 26,,, | Performed by: RADIOLOGY

## 2022-10-28 PROCEDURE — 77080 DXA BONE DENSITY AXIAL: CPT | Mod: 26,,, | Performed by: RADIOLOGY

## 2022-10-28 PROCEDURE — 77081 DEXA BONE DENSITY APPENDICULAR SKELETON: ICD-10-PCS | Mod: 26,59,, | Performed by: RADIOLOGY

## 2022-10-31 ENCOUNTER — TELEPHONE (OUTPATIENT)
Dept: NEUROSURGERY | Facility: CLINIC | Age: 37
End: 2022-10-31
Payer: COMMERCIAL

## 2022-10-31 NOTE — TELEPHONE ENCOUNTER
The bone density shows osteopenia in the non-dominant forearm.  He had recent Vitamin D level of 34.  Labs were done by Dr. Kellogg.  He will start a Calcium, Magnesium, and Vitamin D supplement.    He has developed a double hernia.  He is wondering what time frame would be okay for him to coordinate the hernia repair and neck surgery to do around the same time.  I will discuss with Dr. Denise.

## 2022-11-04 DIAGNOSIS — K40.90 INGUINAL HERNIA: Primary | ICD-10-CM

## 2022-11-10 ENCOUNTER — LAB VISIT (OUTPATIENT)
Dept: FAMILY MEDICINE | Facility: CLINIC | Age: 37
End: 2022-11-10
Payer: COMMERCIAL

## 2022-11-10 DIAGNOSIS — Z11.52 ENCOUNTER FOR SCREENING LABORATORY TESTING FOR COVID-19 VIRUS: Primary | ICD-10-CM

## 2022-11-10 LAB
FLUAV AG UPPER RESP QL IA.RAPID: NOT DETECTED
FLUBV AG UPPER RESP QL IA.RAPID: NOT DETECTED
SARS-COV-2 RNA RESP QL NAA+PROBE: DETECTED

## 2022-11-10 PROCEDURE — 0241U COVID/FLU A&B PCR: CPT | Performed by: EMERGENCY MEDICINE

## 2022-11-21 ENCOUNTER — OFFICE VISIT (OUTPATIENT)
Dept: NEUROSURGERY | Facility: CLINIC | Age: 37
End: 2022-11-21
Payer: COMMERCIAL

## 2022-11-21 VITALS
DIASTOLIC BLOOD PRESSURE: 76 MMHG | WEIGHT: 162 LBS | HEIGHT: 67 IN | BODY MASS INDEX: 25.43 KG/M2 | RESPIRATION RATE: 20 BRPM | SYSTOLIC BLOOD PRESSURE: 128 MMHG | HEART RATE: 90 BPM

## 2022-11-21 DIAGNOSIS — E78.5 DYSLIPIDEMIA: ICD-10-CM

## 2022-11-21 DIAGNOSIS — K21.9 GASTROESOPHAGEAL REFLUX DISEASE WITHOUT ESOPHAGITIS: ICD-10-CM

## 2022-11-21 DIAGNOSIS — M47.22 CERVICAL SPONDYLOSIS WITH RADICULOPATHY: Primary | ICD-10-CM

## 2022-11-21 PROCEDURE — 99215 OFFICE O/P EST HI 40 MIN: CPT | Mod: ,,, | Performed by: PHYSICIAN ASSISTANT

## 2022-11-21 PROCEDURE — 3008F BODY MASS INDEX DOCD: CPT | Mod: CPTII,,, | Performed by: PHYSICIAN ASSISTANT

## 2022-11-21 PROCEDURE — 3074F SYST BP LT 130 MM HG: CPT | Mod: CPTII,,, | Performed by: PHYSICIAN ASSISTANT

## 2022-11-21 PROCEDURE — 3008F PR BODY MASS INDEX (BMI) DOCUMENTED: ICD-10-PCS | Mod: CPTII,,, | Performed by: PHYSICIAN ASSISTANT

## 2022-11-21 PROCEDURE — 3078F PR MOST RECENT DIASTOLIC BLOOD PRESSURE < 80 MM HG: ICD-10-PCS | Mod: CPTII,,, | Performed by: PHYSICIAN ASSISTANT

## 2022-11-21 PROCEDURE — 99215 PR OFFICE/OUTPT VISIT, EST, LEVL V, 40-54 MIN: ICD-10-PCS | Mod: ,,, | Performed by: PHYSICIAN ASSISTANT

## 2022-11-21 PROCEDURE — 3078F DIAST BP <80 MM HG: CPT | Mod: CPTII,,, | Performed by: PHYSICIAN ASSISTANT

## 2022-11-21 PROCEDURE — 3074F PR MOST RECENT SYSTOLIC BLOOD PRESSURE < 130 MM HG: ICD-10-PCS | Mod: CPTII,,, | Performed by: PHYSICIAN ASSISTANT

## 2022-11-21 RX ORDER — HYDROCODONE BITARTRATE AND ACETAMINOPHEN 7.5; 325 MG/1; MG/1
1 TABLET ORAL EVERY 4 HOURS PRN
Qty: 40 TABLET | Refills: 0 | Status: SHIPPED | OUTPATIENT
Start: 2022-11-21 | End: 2022-12-22 | Stop reason: SDUPTHER

## 2022-11-21 RX ORDER — ERGOCALCIFEROL 1.25 MG/1
50000 CAPSULE ORAL WEEKLY
COMMUNITY
End: 2022-12-05 | Stop reason: CLARIF

## 2022-11-21 NOTE — PROGRESS NOTES
[facility]  History & Physical  Neurosurgery      Farzad Lockwood   [id number]   1985       CHIEF COMPLAINT:  Neck pain    HPI:  Farzad Lockwood is a 37 y.o. male who presents for follow up appointment.  The patient has had an updated cervical MRI, cervical CT, and bone density study.  He was previously seen on 10/13/2022.  At that time, the pain was worse on the left than right.  Today, he complains of neck pain with pain radiating into the right greater than left arm along the C6 distribution.  Overall, his pain is progressively worsening.  There is tingling and numbness through the same distribution.  Subjectively, he has weakness in both arms.    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  Four years ago, he began to have neck pain that was of insidious onset.  It has been a few years that he has had pain going into the left upper extremity.  Seven months ago, he began with pain into the right arm.  He has undergone injections with Dr. Sales which were not of significant benefit.  He underwent a course of physical therapy.  This was stopped due to an increase in his symptoms.  Massage has helped his symptoms for short period of time.  He is having to greatly restrict his activities secondary to pain.  He is only able to play the piano for 10 minutes.  He is not able to play stringed instruments due to pain.  His arms fatigue easily, and have a weird feeling while playing.  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  He feels he has exhausted conservative measures.  He reports to have difficulty initiating flow with voiding.  He does not have disturbances in bowel function.  She does not have difficulty with his balance, but notes dizziness, lightheaded feeling when lying down.      Of note,  the patient has a hernia.  He is to undergo ultrasound tomorrow to ensure he is okay to wait for hernia repair.      Past Medical History:   Diagnosis Date    ADHD (attention deficit hyperactivity disorder)     Anxiety disorder, unspecified     Bipolar disorder, unspecified     Brachial neuritis     GERD (gastroesophageal reflux disease)     Gout, unspecified     Hypercholesteremia     Hypothyroidism, unspecified     Insomnia     Neuropathy        Past Surgical History:   Procedure Laterality Date    APPENDECTOMY  2007    CHOLECYSTECTOMY      Radioactive iodine-induced hypothyroidism      RHINOPLASTY         Family History   Problem Relation Age of Onset    Mitral valve prolapse Mother     Mitral valve prolapse Brother     Cancer Maternal Grandfather     Hypothyroidism Paternal Grandmother        Social History     Socioeconomic History    Marital status: Unknown   Tobacco Use    Smoking status: Never    Smokeless tobacco: Never       Review of patient's allergies indicates:   Allergen Reactions    Penicillins Hives    Tramadol Hives     Other reaction(s): NIGHTMARES        Medication List with Changes/Refills   Current Medications    ALLOPURINOL (ZYLOPRIM) 100 MG TABLET    Take 300 mg by mouth once daily.    ARMOUR THYROID 120 MG TAB    Take 1 tablet by mouth once daily.    ARMOUR THYROID 15 MG TAB    Take 1 tablet (15 mg total) by mouth once daily at 6am.    BACLOFEN (LIORESAL) 10 MG TABLET    TAKE 1 TABLET BY MOUTH TWICE A DAY FOR MUSCLE SPASMS    CLONAZEPAM (KLONOPIN) 0.5 MG TABLET    Take 0.5 mg by mouth 3 (three) times daily as needed.    DEXTROAMPHETAMINE-AMPHETAMINE (ADDERALL XR) 20 MG 24 HR CAPSULE    Take 1 capsule by mouth every morning.    ERGOCALCIFEROL (ERGOCALCIFEROL) 50,000 UNIT CAP    Take 50,000 Units by mouth once a week.    LAMOTRIGINE (LAMICTAL) 200 MG TABLET    Take 200 mg by mouth once daily.    MELOXICAM (MOBIC) 15 MG TABLET    Take 1 tablet by mouth once daily.    NALTREXONE CAPSULE    Take  "1 capsule (4.5 mg total) by mouth once daily.    OMEPRAZOLE (PRILOSEC) 40 MG CAPSULE    TAKE 1 CAPSULE BY MOUTH EVERY DAY    PREGABALIN (LYRICA) 75 MG CAPSULE    Take 1 capsule (75 mg total) by mouth 3 (three) times daily.    QUETIAPINE (SEROQUEL) 100 MG TAB    Take 1 tablet by mouth once daily at 6am.    TAMSULOSIN (FLOMAX) 0.4 MG CAP    TAKE 1 CAPSULE BY MOUTH ONCE DAILY        Review of Systems   Constitutional:  Negative for chills and fever.   HENT:  Negative for congestion and hearing loss.    Eyes:  Negative for blurred vision and double vision.   Respiratory:  Negative for cough and wheezing.    Cardiovascular:  Negative for chest pain and palpitations.   Gastrointestinal:  Negative for nausea and vomiting.   Genitourinary:  Negative for urgency.   Musculoskeletal:  Positive for back pain and neck pain.   Skin:  Negative for rash.   Neurological:  Positive for dizziness, tingling, sensory change and headaches.   Psychiatric/Behavioral:  Negative for hallucinations.         Physical Examination:    Vital Signs:  /76 (BP Location: Other (Comment), Patient Position: Sitting)   Pulse 90   Resp 20   Ht 5' 7" (1.702 m)   Wt 73.5 kg (162 lb)   BMI 25.37 kg/m²      General:  Pleasant. Well-nourished. Well-groomed.     CV:  Neck is supple.  There are no carotid bruits.  Hear has regular rate and rhythm.     Lungs:  clear to auscultation bilaterally     Abdomen:  Soft, non-tender, non-distended     Musculoskeletal:  Cervical ROM:  Mildly limited with flexion.  Pain is increased with flexion.  Tinel's is negative at bilateral wrist and elbows.  Spurling's maneuver is negative bilaterally.     Neurological:    Oriented to Person, Place, Time   Muscle strength against resistance:  Strength   Deltoids Triceps Biceps Wrist Extension Wrist Flexion Hand    Upper: R 5/5 5/5 5/5 5/5 5/5 5/5     L 5/5 5/5 5/5 5/5 5/5 5/5   Sensation is intact to primary modalities in bilateral upper extremities.  Reflexes:    " Right Left   Triceps 2+ 2+   Biceps 2+ 2+   Brachioradialis 2+ 2+   Patellar 2+ 2+   Achilles 2+ 2+   Madsen's sign is positive bilaterally.  There is no clonus at the ankles.  Gait: normal  Coordination is normal      Imaging:  Cervical x-rays were obtained on 10/13/2022.  This study shows disc space narrowing and spondylosis at C5-6.  There is slight retrolisthesis of C5 on C6 that reduces with flexion.  MRI of the cervical spine was obtained on 10/19/2022.  At C5-6, there is disc bulging with facet hypertrophy and left uncinate hypertrophy that causes mild central and moderate left greater than right foraminal stenosis.  At C6-7, there is disc bulging with left uncinate hypertrophy that causes mild central and right foraminal with moderate left foraminal stenosis.  CT of the cervical spine was obtained on 10/26/2022.  This study shows disc space narrowing and spondylosis at C5-6.  There is right-greater-than-left facet hypertrophy at C5-6.  Bone density was obtained on 10/28/2022.  This study shows osteopenia in the non dominant forearm.      ASSESSMENT/PLAN:     1. Cervical spondylosis with radiculopathy  HYDROcodone-acetaminophen (NORCO) 7.5-325 mg per tablet      2. Gastroesophageal reflux disease without esophagitis  EKG 12-lead    X-Ray Chest PA And Lateral Pre-OP      3. Dyslipidemia  CBC Auto Differential    Basic Metabolic Panel    X-Ray Cervical Spine 2 or 3 Views      The patient was seen and examined by Dr. Denise.  Options were discussed at length.  Risks, benefits, and possible complications were discussed.  All the patient's questions were answered.  Plan is for C5-6 and C6-7 disc arthroplasty.  This is tentatively scheduled for 12/15/2022.  Consents were signed at this time.    Dr. Denise is okay with the patient proceeding with hernia repair greater than 2 weeks prior to 12/15/2022.  Otherwise, the patient will wait until 6 weeks postop of the disc replacement for hernia repair.      A total of  65 minutes was spent face-to-face with the patient during this encounter.  Over half of that time was spent on counseling and coordination of care.  Additional time was used to review the chart and work on office note.        Cristina Barbosa PA-C

## 2022-11-21 NOTE — H&P (VIEW-ONLY)
[facility]  History & Physical  Neurosurgery      Farzad Lockwood   [id number]   1985       CHIEF COMPLAINT:  Neck pain    HPI:  Farzad Lockwood is a 37 y.o. male who presents for follow up appointment.  The patient has had an updated cervical MRI, cervical CT, and bone density study.  He was previously seen on 10/13/2022.  At that time, the pain was worse on the left than right.  Today, he complains of neck pain with pain radiating into the right greater than left arm along the C6 distribution.  Overall, his pain is progressively worsening.  There is tingling and numbness through the same distribution.  Subjectively, he has weakness in both arms.    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  Four years ago, he began to have neck pain that was of insidious onset.  It has been a few years that he has had pain going into the left upper extremity.  Seven months ago, he began with pain into the right arm.  He has undergone injections with Dr. Sales which were not of significant benefit.  He underwent a course of physical therapy.  This was stopped due to an increase in his symptoms.  Massage has helped his symptoms for short period of time.  He is having to greatly restrict his activities secondary to pain.  He is only able to play the piano for 10 minutes.  He is not able to play stringed instruments due to pain.  His arms fatigue easily, and have a weird feeling while playing.  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  He feels he has exhausted conservative measures.  He reports to have difficulty initiating flow with voiding.  He does not have disturbances in bowel function.  She does not have difficulty with his balance, but notes dizziness, lightheaded feeling when lying down.      Of note,  the patient has a hernia.  He is to undergo ultrasound tomorrow to ensure he is okay to wait for hernia repair.      Past Medical History:   Diagnosis Date    ADHD (attention deficit hyperactivity disorder)     Anxiety disorder, unspecified     Bipolar disorder, unspecified     Brachial neuritis     GERD (gastroesophageal reflux disease)     Gout, unspecified     Hypercholesteremia     Hypothyroidism, unspecified     Insomnia     Neuropathy        Past Surgical History:   Procedure Laterality Date    APPENDECTOMY  2007    CHOLECYSTECTOMY      Radioactive iodine-induced hypothyroidism      RHINOPLASTY         Family History   Problem Relation Age of Onset    Mitral valve prolapse Mother     Mitral valve prolapse Brother     Cancer Maternal Grandfather     Hypothyroidism Paternal Grandmother        Social History     Socioeconomic History    Marital status: Unknown   Tobacco Use    Smoking status: Never    Smokeless tobacco: Never       Review of patient's allergies indicates:   Allergen Reactions    Penicillins Hives    Tramadol Hives     Other reaction(s): NIGHTMARES        Medication List with Changes/Refills   Current Medications    ALLOPURINOL (ZYLOPRIM) 100 MG TABLET    Take 300 mg by mouth once daily.    ARMOUR THYROID 120 MG TAB    Take 1 tablet by mouth once daily.    ARMOUR THYROID 15 MG TAB    Take 1 tablet (15 mg total) by mouth once daily at 6am.    BACLOFEN (LIORESAL) 10 MG TABLET    TAKE 1 TABLET BY MOUTH TWICE A DAY FOR MUSCLE SPASMS    CLONAZEPAM (KLONOPIN) 0.5 MG TABLET    Take 0.5 mg by mouth 3 (three) times daily as needed.    DEXTROAMPHETAMINE-AMPHETAMINE (ADDERALL XR) 20 MG 24 HR CAPSULE    Take 1 capsule by mouth every morning.    ERGOCALCIFEROL (ERGOCALCIFEROL) 50,000 UNIT CAP    Take 50,000 Units by mouth once a week.    LAMOTRIGINE (LAMICTAL) 200 MG TABLET    Take 200 mg by mouth once daily.    MELOXICAM (MOBIC) 15 MG TABLET    Take 1 tablet by mouth once daily.    NALTREXONE CAPSULE    Take  "1 capsule (4.5 mg total) by mouth once daily.    OMEPRAZOLE (PRILOSEC) 40 MG CAPSULE    TAKE 1 CAPSULE BY MOUTH EVERY DAY    PREGABALIN (LYRICA) 75 MG CAPSULE    Take 1 capsule (75 mg total) by mouth 3 (three) times daily.    QUETIAPINE (SEROQUEL) 100 MG TAB    Take 1 tablet by mouth once daily at 6am.    TAMSULOSIN (FLOMAX) 0.4 MG CAP    TAKE 1 CAPSULE BY MOUTH ONCE DAILY        Review of Systems   Constitutional:  Negative for chills and fever.   HENT:  Negative for congestion and hearing loss.    Eyes:  Negative for blurred vision and double vision.   Respiratory:  Negative for cough and wheezing.    Cardiovascular:  Negative for chest pain and palpitations.   Gastrointestinal:  Negative for nausea and vomiting.   Genitourinary:  Negative for urgency.   Musculoskeletal:  Positive for back pain and neck pain.   Skin:  Negative for rash.   Neurological:  Positive for dizziness, tingling, sensory change and headaches.   Psychiatric/Behavioral:  Negative for hallucinations.         Physical Examination:    Vital Signs:  /76 (BP Location: Other (Comment), Patient Position: Sitting)   Pulse 90   Resp 20   Ht 5' 7" (1.702 m)   Wt 73.5 kg (162 lb)   BMI 25.37 kg/m²      General:  Pleasant. Well-nourished. Well-groomed.     CV:  Neck is supple.  There are no carotid bruits.  Hear has regular rate and rhythm.     Lungs:  clear to auscultation bilaterally     Abdomen:  Soft, non-tender, non-distended     Musculoskeletal:  Cervical ROM:  Mildly limited with flexion.  Pain is increased with flexion.  Tinel's is negative at bilateral wrist and elbows.  Spurling's maneuver is negative bilaterally.     Neurological:    Oriented to Person, Place, Time   Muscle strength against resistance:  Strength   Deltoids Triceps Biceps Wrist Extension Wrist Flexion Hand    Upper: R 5/5 5/5 5/5 5/5 5/5 5/5     L 5/5 5/5 5/5 5/5 5/5 5/5   Sensation is intact to primary modalities in bilateral upper extremities.  Reflexes:    " Right Left   Triceps 2+ 2+   Biceps 2+ 2+   Brachioradialis 2+ 2+   Patellar 2+ 2+   Achilles 2+ 2+   Madsen's sign is positive bilaterally.  There is no clonus at the ankles.  Gait: normal  Coordination is normal      Imaging:  Cervical x-rays were obtained on 10/13/2022.  This study shows disc space narrowing and spondylosis at C5-6.  There is slight retrolisthesis of C5 on C6 that reduces with flexion.  MRI of the cervical spine was obtained on 10/19/2022.  At C5-6, there is disc bulging with facet hypertrophy and left uncinate hypertrophy that causes mild central and moderate left greater than right foraminal stenosis.  At C6-7, there is disc bulging with left uncinate hypertrophy that causes mild central and right foraminal with moderate left foraminal stenosis.  CT of the cervical spine was obtained on 10/26/2022.  This study shows disc space narrowing and spondylosis at C5-6.  There is right-greater-than-left facet hypertrophy at C5-6.  Bone density was obtained on 10/28/2022.  This study shows osteopenia in the non dominant forearm.      ASSESSMENT/PLAN:     1. Cervical spondylosis with radiculopathy  HYDROcodone-acetaminophen (NORCO) 7.5-325 mg per tablet      2. Gastroesophageal reflux disease without esophagitis  EKG 12-lead    X-Ray Chest PA And Lateral Pre-OP      3. Dyslipidemia  CBC Auto Differential    Basic Metabolic Panel    X-Ray Cervical Spine 2 or 3 Views      The patient was seen and examined by Dr. Denise.  Options were discussed at length.  Risks, benefits, and possible complications were discussed.  All the patient's questions were answered.  Plan is for C5-6 and C6-7 disc arthroplasty.  This is tentatively scheduled for 12/15/2022.  Consents were signed at this time.    Dr. Denise is okay with the patient proceeding with hernia repair greater than 2 weeks prior to 12/15/2022.  Otherwise, the patient will wait until 6 weeks postop of the disc replacement for hernia repair.      A total of  65 minutes was spent face-to-face with the patient during this encounter.  Over half of that time was spent on counseling and coordination of care.  Additional time was used to review the chart and work on office note.        Cristina Barbosa PA-C

## 2022-11-24 ENCOUNTER — HOSPITAL ENCOUNTER (EMERGENCY)
Facility: HOSPITAL | Age: 37
Discharge: HOME | End: 2022-11-24
Attending: EMERGENCY MEDICINE
Payer: COMMERCIAL

## 2022-11-24 VITALS
DIASTOLIC BLOOD PRESSURE: 92 MMHG | OXYGEN SATURATION: 100 % | HEIGHT: 68 IN | BODY MASS INDEX: 24.25 KG/M2 | TEMPERATURE: 98 F | WEIGHT: 160 LBS | RESPIRATION RATE: 20 BRPM | SYSTOLIC BLOOD PRESSURE: 136 MMHG | HEART RATE: 91 BPM

## 2022-11-24 DIAGNOSIS — K59.00 CONSTIPATION, UNSPECIFIED CONSTIPATION TYPE: Primary | ICD-10-CM

## 2022-11-24 DIAGNOSIS — R10.31 RIGHT LOWER QUADRANT ABDOMINAL PAIN: ICD-10-CM

## 2022-11-24 LAB
ALBUMIN SERPL-MCNC: 4.2 GM/DL (ref 3.5–5)
ALBUMIN/GLOB SERPL: 1.4 RATIO (ref 1.1–2)
ALP SERPL-CCNC: 66 UNIT/L (ref 40–150)
ALT SERPL-CCNC: 36 UNIT/L (ref 0–55)
APPEARANCE UR: CLEAR
AST SERPL-CCNC: 25 UNIT/L (ref 5–34)
BACTERIA #/AREA URNS AUTO: NORMAL /HPF
BASOPHILS # BLD AUTO: 0.03 X10(3)/MCL (ref 0–0.2)
BASOPHILS NFR BLD AUTO: 0.5 %
BILIRUB UR QL STRIP.AUTO: NEGATIVE MG/DL
BILIRUBIN DIRECT+TOT PNL SERPL-MCNC: 0.2 MG/DL
BUN SERPL-MCNC: 14.7 MG/DL (ref 8.9–20.6)
CALCIUM SERPL-MCNC: 9.6 MG/DL (ref 8.4–10.2)
CHLORIDE SERPL-SCNC: 103 MMOL/L (ref 98–107)
CO2 SERPL-SCNC: 28 MMOL/L (ref 22–29)
COLOR UR AUTO: ABNORMAL
CREAT SERPL-MCNC: 1.49 MG/DL (ref 0.73–1.18)
EOSINOPHIL # BLD AUTO: 0.25 X10(3)/MCL (ref 0–0.9)
EOSINOPHIL NFR BLD AUTO: 4.2 %
ERYTHROCYTE [DISTWIDTH] IN BLOOD BY AUTOMATED COUNT: 12.5 % (ref 11.5–17)
GFR SERPLBLD CREATININE-BSD FMLA CKD-EPI: >60 MLS/MIN/1.73/M2
GLOBULIN SER-MCNC: 2.9 GM/DL (ref 2.4–3.5)
GLUCOSE SERPL-MCNC: 109 MG/DL (ref 74–100)
GLUCOSE UR QL STRIP.AUTO: NEGATIVE MG/DL
HCT VFR BLD AUTO: 41.8 % (ref 42–52)
HGB BLD-MCNC: 13.7 GM/DL (ref 14–18)
IMM GRANULOCYTES # BLD AUTO: 0.01 X10(3)/MCL (ref 0–0.04)
IMM GRANULOCYTES NFR BLD AUTO: 0.2 %
KETONES UR QL STRIP.AUTO: NEGATIVE MG/DL
LEUKOCYTE ESTERASE UR QL STRIP.AUTO: ABNORMAL UNIT/L
LYMPHOCYTES # BLD AUTO: 2.11 X10(3)/MCL (ref 0.6–4.6)
LYMPHOCYTES NFR BLD AUTO: 35.3 %
MCH RBC QN AUTO: 30.4 PG (ref 27–31)
MCHC RBC AUTO-ENTMCNC: 32.8 MG/DL (ref 33–36)
MCV RBC AUTO: 92.7 FL (ref 80–94)
MONOCYTES # BLD AUTO: 0.52 X10(3)/MCL (ref 0.1–1.3)
MONOCYTES NFR BLD AUTO: 8.7 %
NEUTROPHILS # BLD AUTO: 3.1 X10(3)/MCL (ref 2.1–9.2)
NEUTROPHILS NFR BLD AUTO: 51.1 %
NITRITE UR QL STRIP.AUTO: NEGATIVE
NRBC BLD AUTO-RTO: 0 %
PH UR STRIP.AUTO: 6.5 [PH]
PLATELET # BLD AUTO: 294 X10(3)/MCL (ref 130–400)
PMV BLD AUTO: 9.8 FL (ref 7.4–10.4)
POTASSIUM SERPL-SCNC: 4 MMOL/L (ref 3.5–5.1)
PROT SERPL-MCNC: 7.1 GM/DL (ref 6.4–8.3)
PROT UR QL STRIP.AUTO: NEGATIVE MG/DL
RBC # BLD AUTO: 4.51 X10(6)/MCL (ref 4.7–6.1)
RBC #/AREA URNS AUTO: NORMAL /HPF
RBC UR QL AUTO: NEGATIVE UNIT/L
SODIUM SERPL-SCNC: 141 MMOL/L (ref 136–145)
SP GR UR STRIP.AUTO: 1.01
SQUAMOUS #/AREA URNS AUTO: NORMAL /HPF
UROBILINOGEN UR STRIP-ACNC: 0.2 MG/DL
WBC # SPEC AUTO: 6 X10(3)/MCL (ref 4.5–11.5)
WBC #/AREA URNS AUTO: NORMAL /HPF

## 2022-11-24 PROCEDURE — 99285 EMERGENCY DEPT VISIT HI MDM: CPT | Mod: 25

## 2022-11-24 PROCEDURE — 81001 URINALYSIS AUTO W/SCOPE: CPT | Performed by: EMERGENCY MEDICINE

## 2022-11-24 PROCEDURE — 81003 URINALYSIS AUTO W/O SCOPE: CPT | Performed by: EMERGENCY MEDICINE

## 2022-11-24 PROCEDURE — 80053 COMPREHEN METABOLIC PANEL: CPT | Performed by: EMERGENCY MEDICINE

## 2022-11-24 PROCEDURE — 85025 COMPLETE CBC W/AUTO DIFF WBC: CPT | Performed by: EMERGENCY MEDICINE

## 2022-11-24 PROCEDURE — 25500020 PHARM REV CODE 255: Performed by: EMERGENCY MEDICINE

## 2022-11-24 RX ORDER — POLYETHYLENE GLYCOL 3350 17 G/17G
17 POWDER, FOR SOLUTION ORAL DAILY
Qty: 595 G | Refills: 0 | Status: SHIPPED | OUTPATIENT
Start: 2022-11-24 | End: 2023-03-27

## 2022-11-24 RX ADMIN — IOPAMIDOL 100 ML: 755 INJECTION, SOLUTION INTRAVENOUS at 06:11

## 2022-11-24 NOTE — ED PROVIDER NOTES
Encounter Date: 11/24/2022       History     Chief Complaint   Patient presents with    Abdominal Pain     Pt c/o pain to right lower abd pain onset 4 weeks ago. States had abd ultrasound yesterday and was negative for hernia.     Mr. Ascencio is a 37-year-old male complaining of intermittent right lower quadrant abdominal pain that comes and goes, sometimes associated with crampy abdominal pain like he needs to have a bowel movement.  He states he feels like it is swollen in that area (RLQ) and had an ultrasound yesterday to rule out hernia.  He has had an appendectomy.  Appetite is decreased.      Review of patient's allergies indicates:   Allergen Reactions    Penicillins Hives    Tramadol Hives     Other reaction(s): NIGHTMARES     Past Medical History:   Diagnosis Date    ADHD (attention deficit hyperactivity disorder)     Anxiety disorder, unspecified     Bipolar disorder, unspecified     Brachial neuritis     GERD (gastroesophageal reflux disease)     Gout, unspecified     Hypercholesteremia     Hypothyroidism, unspecified     Insomnia     Neuropathy      Past Surgical History:   Procedure Laterality Date    APPENDECTOMY  2007    CHOLECYSTECTOMY      Radioactive iodine-induced hypothyroidism      RHINOPLASTY       Family History   Problem Relation Age of Onset    Mitral valve prolapse Mother     Mitral valve prolapse Brother     Cancer Maternal Grandfather     Hypothyroidism Paternal Grandmother      Social History     Tobacco Use    Smoking status: Never    Smokeless tobacco: Never     Review of Systems   Gastrointestinal:  Positive for abdominal pain.   All other systems reviewed and are negative.    Physical Exam     Initial Vitals [11/24/22 1656]   BP Pulse Resp Temp SpO2   (!) 136/92 91 20 98.1 °F (36.7 °C) 100 %      MAP       --         Physical Exam    Nursing note and vitals reviewed.  Constitutional: Vital signs are normal. He appears well-developed and well-nourished.   HENT:   Head: Normocephalic  and atraumatic.   Eyes: Pupils are equal, round, and reactive to light.   Neck: Neck supple.   Cardiovascular:  Normal rate, regular rhythm and normal heart sounds.           Pulmonary/Chest: Breath sounds normal. No respiratory distress.   Abdominal:   Very mild right lower quadrant tenderness with no rebound or guarding.  No palpable mass.  On visual inspection, the musculature in the right lower quadrant appears to be more prominent than in the left lower quadrant.   Musculoskeletal:         General: No tenderness or edema.      Cervical back: Neck supple. No edema or erythema.      Comments: Ambulatory without assistance     Neurological: He is alert.   Skin: Skin is warm and dry.   Psychiatric: He has a normal mood and affect. Thought content normal.       ED Course   Procedures  Labs Reviewed   CBC WITH DIFFERENTIAL - Abnormal; Notable for the following components:       Result Value    RBC 4.51 (*)     Hgb 13.7 (*)     Hct 41.8 (*)     MCHC 32.8 (*)     All other components within normal limits   COMPREHENSIVE METABOLIC PANEL - Abnormal; Notable for the following components:    Glucose Level 109 (*)     Creatinine 1.49 (*)     All other components within normal limits   URINALYSIS, REFLEX TO URINE CULTURE - Abnormal; Notable for the following components:    Leukocyte Esterase, UA Trace (*)     All other components within normal limits   URINALYSIS, MICROSCOPIC - Normal          Imaging Results              CT Abdomen Pelvis With Contrast (Preliminary result)  Result time 11/24/22 19:18:32      Preliminary result by Dickson Cross MD (11/24/22 19:18:32)                   Narrative:    START OF REPORT:  Technique: CT of the abdomen and pelvis was performed with axial images as well as sagittal and coronal reconstruction images with intravenous contrast.    Comparison: None available.    Clinical History: Pelvic pain.    Dosage Information: Automated Exposure Control was utilized 248.19  mGy.cm.    Findings:  Lines and Tubes: None.  Thorax:  Lungs: The visualized lung bases appear unremarkable.  Heart: The heart size is within normal limits.  Abdomen:  Abdominal Wall: No abdominal wall pathology is seen.  Liver: Mild fatty infiltration of the liver is present. The liver otherwise appears unremarkable.  Biliary System: No extrahepatic biliary duct dilatation is seen.  Gallbladder: Surgical clips are seen in the gallbladder fossa which may reflect prior cholecystectomy.  Pancreas: The pancreas appears unremarkable.  Spleen: The spleen appears unremarkable.  Adrenals: The adrenal glands appear unremarkable.  Kidneys: The kidneys appear unremarkable with no stones cysts masses or hydronephrosis.  Aorta: The abdominal aorta appears unremarkable.  Bowel:  Esophagus: The visualized esophagus appears unremarkable.  Stomach: The stomach appears unremarkable.  Duodenum: Unremarkable appearing duodenum.  Small Bowel: The small bowel appears unremarkable.  Colon: There is moderate stool in the ascending through and sigmoid colon which could reflect an element of constipation.  Appendix: The appendix is not identified but no inflammatory changes are seen in the right lower quadrant to suggest appendicitis.    Pelvis:  Bladder: The bladder appears unremarkable.  Male:  Prostate gland: The prostate gland appears unremarkable.    Bony structures:  Dorsal Spine: The visualized dorsal spine appears unremarkable.  Bony Pelvis: The visualized bony structures of the pelvis appear unremarkable.      Impression:  1. Details and other findings as discussed above.                          Preliminary result by Interface, Rad Results In (11/24/22 19:18:32)                   Narrative:    START OF REPORT:  Technique: CT of the abdomen and pelvis was performed with axial images as well as sagittal and coronal reconstruction images with intravenous contrast.    Comparison: None available.    Clinical History: Pelvic  pain.    Dosage Information: Automated Exposure Control was utilized 248.19 mGy.cm.    Findings:  Lines and Tubes: None.  Thorax:  Lungs: The visualized lung bases appear unremarkable.  Heart: The heart size is within normal limits.  Abdomen:  Abdominal Wall: No abdominal wall pathology is seen.  Liver: Mild fatty infiltration of the liver is present. The liver otherwise appears unremarkable.  Biliary System: No extrahepatic biliary duct dilatation is seen.  Gallbladder: Surgical clips are seen in the gallbladder fossa which may reflect prior cholecystectomy.  Pancreas: The pancreas appears unremarkable.  Spleen: The spleen appears unremarkable.  Adrenals: The adrenal glands appear unremarkable.  Kidneys: The kidneys appear unremarkable with no stones cysts masses or hydronephrosis.  Aorta: The abdominal aorta appears unremarkable.  Bowel:  Esophagus: The visualized esophagus appears unremarkable.  Stomach: The stomach appears unremarkable.  Duodenum: Unremarkable appearing duodenum.  Small Bowel: The small bowel appears unremarkable.  Colon: There is moderate stool in the ascending through and sigmoid colon which could reflect an element of constipation.  Appendix: The appendix is not identified but no inflammatory changes are seen in the right lower quadrant to suggest appendicitis.    Pelvis:  Bladder: The bladder appears unremarkable.  Male:  Prostate gland: The prostate gland appears unremarkable.    Bony structures:  Dorsal Spine: The visualized dorsal spine appears unremarkable.  Bony Pelvis: The visualized bony structures of the pelvis appear unremarkable.      Impression:  1. Details and other findings as discussed above.                                         Medications   iopamidoL (ISOVUE-370) injection 100 mL (100 mLs Intravenous Given 11/24/22 6003)                              Clinical Impression:   Final diagnoses:  [K59.00] Constipation, unspecified constipation type (Primary)  [R10.31] Right lower  quadrant abdominal pain      ED Disposition Condition    Discharge Stable          ED Prescriptions       Medication Sig Dispense Start Date End Date Auth. Provider    polyethylene glycol (GLYCOLAX) 17 gram/dose powder Take 17 g by mouth once daily. 595 g 11/24/2022 -- Rosa Velazquez MD          Follow-up Information       Follow up With Specialties Details Why Contact Info    Cash Kellogg MD Family Medicine Schedule an appointment as soon as possible for a visit   [address] [place] [id number]  [phone]               Rosa Velazquez MD  11/25/22 0716

## 2022-11-24 NOTE — ED TRIAGE NOTES
Pt c/o pain to right lower abd pain onset 4 weeks ago. States had abd ultrasound yesterday and was negative for hernia.

## 2022-12-01 ENCOUNTER — HOSPITAL ENCOUNTER (OUTPATIENT)
Dept: RADIOLOGY | Facility: HOSPITAL | Age: 37
Discharge: HOME | End: 2022-12-01
Attending: PHYSICIAN ASSISTANT
Payer: COMMERCIAL

## 2022-12-01 ENCOUNTER — TELEPHONE (OUTPATIENT)
Dept: NEUROSURGERY | Facility: CLINIC | Age: 37
End: 2022-12-01
Payer: COMMERCIAL

## 2022-12-01 DIAGNOSIS — K21.9 GASTROESOPHAGEAL REFLUX DISEASE WITHOUT ESOPHAGITIS: ICD-10-CM

## 2022-12-01 PROCEDURE — 71046 X-RAY EXAM CHEST 2 VIEWS: CPT | Mod: TC

## 2022-12-01 NOTE — TELEPHONE ENCOUNTER
I spoke to the patient regarding labs.  He has inc BUN/CR.  I asked him to push fluids.  He has increased his caffeine intact.  He thinks that might be the issue.  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

## 2022-12-05 RX ORDER — TADALAFIL 10 MG/1
1-2 TABLET ORAL DAILY PRN
COMMUNITY
Start: 2022-11-30 | End: 2024-01-22

## 2022-12-05 RX ORDER — LAMOTRIGINE 200 MG/1
200 TABLET ORAL DAILY
COMMUNITY
End: 2022-12-08

## 2022-12-13 ENCOUNTER — PATIENT MESSAGE (OUTPATIENT)
Dept: ADMINISTRATIVE | Facility: OTHER | Age: 37
End: 2022-12-13
Payer: COMMERCIAL

## 2022-12-13 ENCOUNTER — ANESTHESIA EVENT (OUTPATIENT)
Dept: SURGERY | Facility: HOSPITAL | Age: 37
End: 2022-12-13
Payer: COMMERCIAL

## 2022-12-14 ENCOUNTER — TELEPHONE (OUTPATIENT)
Dept: NEUROSURGERY | Facility: CLINIC | Age: 37
End: 2022-12-14
Payer: COMMERCIAL

## 2022-12-14 DIAGNOSIS — M47.22 CERVICAL SPONDYLOSIS WITH RADICULOPATHY: Primary | ICD-10-CM

## 2022-12-14 RX ORDER — MUPIROCIN 20 MG/G
1 OINTMENT TOPICAL 2 TIMES DAILY
Status: CANCELLED | OUTPATIENT
Start: 2022-12-14 | End: 2022-12-15

## 2022-12-15 ENCOUNTER — ANESTHESIA (OUTPATIENT)
Dept: SURGERY | Facility: HOSPITAL | Age: 37
End: 2022-12-15
Payer: COMMERCIAL

## 2022-12-19 ENCOUNTER — TELEPHONE (OUTPATIENT)
Dept: NEUROSURGERY | Facility: CLINIC | Age: 37
End: 2022-12-19
Payer: COMMERCIAL

## 2022-12-19 NOTE — TELEPHONE ENCOUNTER
"Per preservice rep, Candicandice Leviazael, "Case was denied/ Peer to peer [phone] is needed & must be performed by Friday. Clinical did not support requested surgery ins stated." Ref #[id number]. CPT codes are 16850, 23829, outpt. Surgery is scheduled for tomorrow, 12/20/22.   Only time available for them was 430 today (spoke with Fabian w/ [facility]). Dr. Meraz will be calling. I gave them your cell number in case the phones are rolled.   "

## 2022-12-19 NOTE — TELEPHONE ENCOUNTER
Preop call complete. Patient is aware sx was denied and there is a P2P set up for this afternoon. We will call him after.

## 2022-12-20 ENCOUNTER — HOSPITAL ENCOUNTER (OUTPATIENT)
Facility: HOSPITAL | Age: 37
Discharge: HOME | End: 2022-12-21
Attending: NEUROLOGICAL SURGERY | Admitting: NEUROLOGICAL SURGERY
Payer: COMMERCIAL

## 2022-12-20 DIAGNOSIS — M47.12 CERVICAL SPONDYLOSIS WITH MYELOPATHY AND RADICULOPATHY: Primary | ICD-10-CM

## 2022-12-20 DIAGNOSIS — M47.22 CERVICAL SPONDYLOSIS WITH MYELOPATHY AND RADICULOPATHY: Primary | ICD-10-CM

## 2022-12-20 DIAGNOSIS — M47.22 CERVICAL SPONDYLOSIS WITH RADICULOPATHY: ICD-10-CM

## 2022-12-20 LAB
GROUP & RH: NORMAL
INDIRECT COOMBS GEL: NORMAL

## 2022-12-20 PROCEDURE — 25000003 PHARM REV CODE 250: Performed by: NURSE ANESTHETIST, CERTIFIED REGISTERED

## 2022-12-20 PROCEDURE — 22856 PR TOTAL DISC ARTHROPLASTY, CERVICAL, SINGLE: ICD-10-PCS | Mod: ,,, | Performed by: NEUROLOGICAL SURGERY

## 2022-12-20 PROCEDURE — 22858 PR TOT DISC ARTHROPLSTY INCL DISCECTOMY W/END PLATE PREP SECOND LVL: ICD-10-PCS | Mod: AS,,, | Performed by: NURSE PRACTITIONER

## 2022-12-20 PROCEDURE — 22856 TOT DISC ARTHRP 1NTRSPC CRV: CPT | Mod: ,,, | Performed by: NEUROLOGICAL SURGERY

## 2022-12-20 PROCEDURE — 25000003 PHARM REV CODE 250: Performed by: NURSE PRACTITIONER

## 2022-12-20 PROCEDURE — C1713 ANCHOR/SCREW BN/BN,TIS/BN: HCPCS | Performed by: NEUROLOGICAL SURGERY

## 2022-12-20 PROCEDURE — 71000033 HC RECOVERY, INTIAL HOUR: Performed by: NEUROLOGICAL SURGERY

## 2022-12-20 PROCEDURE — 37000008 HC ANESTHESIA 1ST 15 MINUTES: Performed by: NEUROLOGICAL SURGERY

## 2022-12-20 PROCEDURE — 63600175 PHARM REV CODE 636 W HCPCS: Performed by: ANESTHESIOLOGY

## 2022-12-20 PROCEDURE — 96375 TX/PRO/DX INJ NEW DRUG ADDON: CPT | Mod: 59

## 2022-12-20 PROCEDURE — G0378 HOSPITAL OBSERVATION PER HR: HCPCS

## 2022-12-20 PROCEDURE — 36000710: Performed by: NEUROLOGICAL SURGERY

## 2022-12-20 PROCEDURE — 25000003 PHARM REV CODE 250: Performed by: NEUROLOGICAL SURGERY

## 2022-12-20 PROCEDURE — 25000003 PHARM REV CODE 250: Performed by: ANESTHESIOLOGY

## 2022-12-20 PROCEDURE — 63600175 PHARM REV CODE 636 W HCPCS: Performed by: NURSE ANESTHETIST, CERTIFIED REGISTERED

## 2022-12-20 PROCEDURE — 22858 TOT DISC ARTHRP 2ND LVL CRV: CPT | Mod: ,,, | Performed by: NEUROLOGICAL SURGERY

## 2022-12-20 PROCEDURE — 22856 TOT DISC ARTHRP 1NTRSPC CRV: CPT | Mod: AS,,, | Performed by: NURSE PRACTITIONER

## 2022-12-20 PROCEDURE — 37000009 HC ANESTHESIA EA ADD 15 MINS: Performed by: NEUROLOGICAL SURGERY

## 2022-12-20 PROCEDURE — C1729 CATH, DRAINAGE: HCPCS | Performed by: NEUROLOGICAL SURGERY

## 2022-12-20 PROCEDURE — 27201423 OPTIME MED/SURG SUP & DEVICES STERILE SUPPLY: Performed by: NEUROLOGICAL SURGERY

## 2022-12-20 PROCEDURE — 63600175 PHARM REV CODE 636 W HCPCS: Performed by: NEUROLOGICAL SURGERY

## 2022-12-20 PROCEDURE — 22858 TOT DISC ARTHRP 2ND LVL CRV: CPT | Mod: AS,,, | Performed by: NURSE PRACTITIONER

## 2022-12-20 PROCEDURE — 22858 PR TOT DISC ARTHROPLSTY INCL DISCECTOMY W/END PLATE PREP SECOND LVL: ICD-10-PCS | Mod: ,,, | Performed by: NEUROLOGICAL SURGERY

## 2022-12-20 PROCEDURE — 36415 COLL VENOUS BLD VENIPUNCTURE: CPT | Performed by: NEUROLOGICAL SURGERY

## 2022-12-20 PROCEDURE — 63600175 PHARM REV CODE 636 W HCPCS: Performed by: NURSE PRACTITIONER

## 2022-12-20 PROCEDURE — 63600175 PHARM REV CODE 636 W HCPCS

## 2022-12-20 PROCEDURE — 86850 RBC ANTIBODY SCREEN: CPT | Performed by: NEUROLOGICAL SURGERY

## 2022-12-20 PROCEDURE — 22856 PR TOTAL DISC ARTHROPLASTY, CERVICAL, SINGLE: ICD-10-PCS | Mod: AS,,, | Performed by: NURSE PRACTITIONER

## 2022-12-20 PROCEDURE — 36000711: Performed by: NEUROLOGICAL SURGERY

## 2022-12-20 DEVICE — PRODISC C VIVO, LARGE, 5MM
Type: IMPLANTABLE DEVICE | Site: SPINE CERVICAL | Status: FUNCTIONAL
Brand: PRODISC C VIVO

## 2022-12-20 RX ORDER — CLINDAMYCIN PHOSPHATE 900 MG/50ML
INJECTION, SOLUTION INTRAVENOUS
Status: DISCONTINUED | OUTPATIENT
Start: 2022-12-20 | End: 2022-12-20

## 2022-12-20 RX ORDER — ONDANSETRON 4 MG/1
4 TABLET, ORALLY DISINTEGRATING ORAL ONCE
Status: COMPLETED | OUTPATIENT
Start: 2022-12-20 | End: 2022-12-20

## 2022-12-20 RX ORDER — FENTANYL CITRATE 50 UG/ML
INJECTION, SOLUTION INTRAMUSCULAR; INTRAVENOUS
Status: DISCONTINUED | OUTPATIENT
Start: 2022-12-20 | End: 2022-12-20

## 2022-12-20 RX ORDER — CLINDAMYCIN PHOSPHATE 600 MG/50ML
600 INJECTION, SOLUTION INTRAVENOUS
Status: COMPLETED | OUTPATIENT
Start: 2022-12-20 | End: 2022-12-21

## 2022-12-20 RX ORDER — PREGABALIN 75 MG/1
75 CAPSULE ORAL 2 TIMES DAILY
Status: DISCONTINUED | OUTPATIENT
Start: 2022-12-20 | End: 2022-12-21 | Stop reason: HOSPADM

## 2022-12-20 RX ORDER — DEXAMETHASONE SODIUM PHOSPHATE 4 MG/ML
INJECTION, SOLUTION INTRA-ARTICULAR; INTRALESIONAL; INTRAMUSCULAR; INTRAVENOUS; SOFT TISSUE
Status: DISCONTINUED | OUTPATIENT
Start: 2022-12-20 | End: 2022-12-20

## 2022-12-20 RX ORDER — QUETIAPINE FUMARATE 25 MG/1
100 TABLET, FILM COATED ORAL NIGHTLY PRN
Status: DISCONTINUED | OUTPATIENT
Start: 2022-12-20 | End: 2022-12-21 | Stop reason: HOSPADM

## 2022-12-20 RX ORDER — ACETAMINOPHEN 500 MG
1000 TABLET ORAL ONCE
Status: COMPLETED | OUTPATIENT
Start: 2022-12-20 | End: 2022-12-20

## 2022-12-20 RX ORDER — THYROID 15 MG/1
15 TABLET ORAL
Status: DISCONTINUED | OUTPATIENT
Start: 2022-12-21 | End: 2022-12-21 | Stop reason: HOSPADM

## 2022-12-20 RX ORDER — LIDOCAINE HYDROCHLORIDE 20 MG/ML
INJECTION, SOLUTION EPIDURAL; INFILTRATION; INTRACAUDAL; PERINEURAL
Status: DISCONTINUED | OUTPATIENT
Start: 2022-12-20 | End: 2022-12-20

## 2022-12-20 RX ORDER — HYDROMORPHONE HYDROCHLORIDE 2 MG/ML
0.2 INJECTION, SOLUTION INTRAMUSCULAR; INTRAVENOUS; SUBCUTANEOUS EVERY 5 MIN PRN
Status: COMPLETED | OUTPATIENT
Start: 2022-12-20 | End: 2022-12-20

## 2022-12-20 RX ORDER — MUPIROCIN 20 MG/G
OINTMENT TOPICAL 2 TIMES DAILY
Status: DISCONTINUED | OUTPATIENT
Start: 2022-12-20 | End: 2022-12-21 | Stop reason: HOSPADM

## 2022-12-20 RX ORDER — HYDROCODONE BITARTRATE AND ACETAMINOPHEN 5; 325 MG/1; MG/1
1 TABLET ORAL EVERY 4 HOURS PRN
Status: DISCONTINUED | OUTPATIENT
Start: 2022-12-20 | End: 2022-12-21 | Stop reason: HOSPADM

## 2022-12-20 RX ORDER — MIDAZOLAM HYDROCHLORIDE 1 MG/ML
2 INJECTION INTRAMUSCULAR; INTRAVENOUS ONCE AS NEEDED
Status: CANCELLED | OUTPATIENT
Start: 2022-12-20 | End: 2034-05-18

## 2022-12-20 RX ORDER — ONDANSETRON HYDROCHLORIDE 2 MG/ML
INJECTION, SOLUTION INTRAMUSCULAR; INTRAVENOUS
Status: DISCONTINUED | OUTPATIENT
Start: 2022-12-20 | End: 2022-12-20

## 2022-12-20 RX ORDER — METOCLOPRAMIDE HYDROCHLORIDE 5 MG/ML
10 INJECTION INTRAMUSCULAR; INTRAVENOUS ONCE
Status: COMPLETED | OUTPATIENT
Start: 2022-12-20 | End: 2022-12-20

## 2022-12-20 RX ORDER — DIPHENHYDRAMINE HYDROCHLORIDE 50 MG/ML
25 INJECTION INTRAMUSCULAR; INTRAVENOUS EVERY 6 HOURS PRN
Status: DISCONTINUED | OUTPATIENT
Start: 2022-12-20 | End: 2022-12-21 | Stop reason: HOSPADM

## 2022-12-20 RX ORDER — ACETAMINOPHEN 10 MG/ML
INJECTION, SOLUTION INTRAVENOUS
Status: DISCONTINUED | OUTPATIENT
Start: 2022-12-20 | End: 2022-12-20

## 2022-12-20 RX ORDER — POLYETHYLENE GLYCOL 3350 17 G/17G
17 POWDER, FOR SOLUTION ORAL DAILY
Status: DISCONTINUED | OUTPATIENT
Start: 2022-12-21 | End: 2022-12-21 | Stop reason: HOSPADM

## 2022-12-20 RX ORDER — ROCURONIUM BROMIDE 10 MG/ML
INJECTION, SOLUTION INTRAVENOUS
Status: DISCONTINUED | OUTPATIENT
Start: 2022-12-20 | End: 2022-12-20

## 2022-12-20 RX ORDER — LIDOCAINE HYDROCHLORIDE 10 MG/ML
1 INJECTION, SOLUTION EPIDURAL; INFILTRATION; INTRACAUDAL; PERINEURAL ONCE
Status: CANCELLED | OUTPATIENT
Start: 2022-12-20 | End: 2022-12-20

## 2022-12-20 RX ORDER — BACLOFEN 10 MG/1
10 TABLET ORAL 3 TIMES DAILY PRN
Status: DISCONTINUED | OUTPATIENT
Start: 2022-12-20 | End: 2022-12-21 | Stop reason: HOSPADM

## 2022-12-20 RX ORDER — CLONAZEPAM 0.5 MG/1
0.5 TABLET ORAL 2 TIMES DAILY PRN
Status: DISCONTINUED | OUTPATIENT
Start: 2022-12-20 | End: 2022-12-21 | Stop reason: HOSPADM

## 2022-12-20 RX ORDER — IPRATROPIUM BROMIDE AND ALBUTEROL SULFATE 2.5; .5 MG/3ML; MG/3ML
3 SOLUTION RESPIRATORY (INHALATION) ONCE AS NEEDED
Status: DISCONTINUED | OUTPATIENT
Start: 2022-12-20 | End: 2022-12-21 | Stop reason: HOSPADM

## 2022-12-20 RX ORDER — KETOROLAC TROMETHAMINE 30 MG/ML
INJECTION, SOLUTION INTRAMUSCULAR; INTRAVENOUS
Status: DISCONTINUED | OUTPATIENT
Start: 2022-12-20 | End: 2022-12-20

## 2022-12-20 RX ORDER — FAMOTIDINE 10 MG/ML
20 INJECTION INTRAVENOUS ONCE
Status: COMPLETED | OUTPATIENT
Start: 2022-12-20 | End: 2022-12-20

## 2022-12-20 RX ORDER — ONDANSETRON 2 MG/ML
4 INJECTION INTRAMUSCULAR; INTRAVENOUS EVERY 6 HOURS PRN
Status: DISCONTINUED | OUTPATIENT
Start: 2022-12-20 | End: 2022-12-21 | Stop reason: HOSPADM

## 2022-12-20 RX ORDER — CLINDAMYCIN PHOSPHATE 900 MG/50ML
900 INJECTION, SOLUTION INTRAVENOUS
Status: DISCONTINUED | OUTPATIENT
Start: 2022-12-20 | End: 2022-12-21 | Stop reason: HOSPADM

## 2022-12-20 RX ORDER — HYDROCODONE BITARTRATE AND ACETAMINOPHEN 10; 325 MG/1; MG/1
1 TABLET ORAL EVERY 4 HOURS PRN
Status: DISCONTINUED | OUTPATIENT
Start: 2022-12-20 | End: 2022-12-21 | Stop reason: HOSPADM

## 2022-12-20 RX ORDER — PROPOFOL 10 MG/ML
VIAL (ML) INTRAVENOUS
Status: DISCONTINUED | OUTPATIENT
Start: 2022-12-20 | End: 2022-12-20

## 2022-12-20 RX ORDER — GABAPENTIN 300 MG/1
300 CAPSULE ORAL
Status: COMPLETED | OUTPATIENT
Start: 2022-12-20 | End: 2022-12-20

## 2022-12-20 RX ORDER — GLYCOPYRROLATE 0.2 MG/ML
INJECTION INTRAMUSCULAR; INTRAVENOUS
Status: DISCONTINUED | OUTPATIENT
Start: 2022-12-20 | End: 2022-12-20

## 2022-12-20 RX ORDER — PROPOFOL 10 MG/ML
VIAL (ML) INTRAVENOUS CONTINUOUS PRN
Status: DISCONTINUED | OUTPATIENT
Start: 2022-12-20 | End: 2022-12-20

## 2022-12-20 RX ORDER — HYDROMORPHONE HYDROCHLORIDE 2 MG/ML
0.2 INJECTION, SOLUTION INTRAMUSCULAR; INTRAVENOUS; SUBCUTANEOUS EVERY 5 MIN PRN
Status: DISCONTINUED | OUTPATIENT
Start: 2022-12-20 | End: 2022-12-21 | Stop reason: HOSPADM

## 2022-12-20 RX ORDER — MORPHINE SULFATE 10 MG/ML
2 INJECTION INTRAMUSCULAR; INTRAVENOUS; SUBCUTANEOUS
Status: DISCONTINUED | OUTPATIENT
Start: 2022-12-20 | End: 2022-12-21 | Stop reason: HOSPADM

## 2022-12-20 RX ORDER — SODIUM CHLORIDE 9 MG/ML
INJECTION, SOLUTION INTRAVENOUS CONTINUOUS
Status: DISCONTINUED | OUTPATIENT
Start: 2022-12-20 | End: 2022-12-21 | Stop reason: HOSPADM

## 2022-12-20 RX ORDER — PANTOPRAZOLE SODIUM 40 MG/1
40 TABLET, DELAYED RELEASE ORAL DAILY
Status: DISCONTINUED | OUTPATIENT
Start: 2022-12-21 | End: 2022-12-21 | Stop reason: HOSPADM

## 2022-12-20 RX ORDER — SUCRALFATE 1 G/1
1 TABLET ORAL EVERY 6 HOURS
Status: DISCONTINUED | OUTPATIENT
Start: 2022-12-21 | End: 2022-12-21 | Stop reason: HOSPADM

## 2022-12-20 RX ORDER — BUPIVACAINE HCL/EPINEPHRINE 0.5-1:200K
VIAL (ML) INJECTION
Status: DISCONTINUED | OUTPATIENT
Start: 2022-12-20 | End: 2022-12-20 | Stop reason: HOSPADM

## 2022-12-20 RX ORDER — AMOXICILLIN 250 MG
2 CAPSULE ORAL NIGHTLY PRN
Status: DISCONTINUED | OUTPATIENT
Start: 2022-12-20 | End: 2022-12-21 | Stop reason: HOSPADM

## 2022-12-20 RX ORDER — ONDANSETRON 2 MG/ML
4 INJECTION INTRAMUSCULAR; INTRAVENOUS ONCE
Status: DISCONTINUED | OUTPATIENT
Start: 2022-12-20 | End: 2022-12-21 | Stop reason: HOSPADM

## 2022-12-20 RX ORDER — METHOCARBAMOL 500 MG/1
1000 TABLET, FILM COATED ORAL EVERY 8 HOURS PRN
Status: DISCONTINUED | OUTPATIENT
Start: 2022-12-20 | End: 2022-12-21 | Stop reason: HOSPADM

## 2022-12-20 RX ORDER — METOCLOPRAMIDE HYDROCHLORIDE 5 MG/ML
10 INJECTION INTRAMUSCULAR; INTRAVENOUS EVERY 10 MIN PRN
Status: DISCONTINUED | OUTPATIENT
Start: 2022-12-20 | End: 2022-12-21 | Stop reason: HOSPADM

## 2022-12-20 RX ORDER — MEPERIDINE HYDROCHLORIDE 25 MG/ML
12.5 INJECTION INTRAMUSCULAR; INTRAVENOUS; SUBCUTANEOUS ONCE
Status: DISCONTINUED | OUTPATIENT
Start: 2022-12-20 | End: 2022-12-21 | Stop reason: HOSPADM

## 2022-12-20 RX ORDER — MAG HYDROX/ALUMINUM HYD/SIMETH 200-200-20
30 SUSPENSION, ORAL (FINAL DOSE FORM) ORAL
Status: DISCONTINUED | OUTPATIENT
Start: 2022-12-20 | End: 2022-12-21 | Stop reason: HOSPADM

## 2022-12-20 RX ORDER — SODIUM CHLORIDE 9 MG/ML
INJECTION, SOLUTION INTRAVENOUS CONTINUOUS
Status: CANCELLED | OUTPATIENT
Start: 2022-12-20

## 2022-12-20 RX ORDER — LAMOTRIGINE 100 MG/1
200 TABLET ORAL DAILY
Status: DISCONTINUED | OUTPATIENT
Start: 2022-12-21 | End: 2022-12-21 | Stop reason: HOSPADM

## 2022-12-20 RX ORDER — TAMSULOSIN HYDROCHLORIDE 0.4 MG/1
1 CAPSULE ORAL DAILY
Status: DISCONTINUED | OUTPATIENT
Start: 2022-12-21 | End: 2022-12-21 | Stop reason: HOSPADM

## 2022-12-20 RX ORDER — ONDANSETRON 2 MG/ML
4 INJECTION INTRAMUSCULAR; INTRAVENOUS DAILY PRN
Status: DISCONTINUED | OUTPATIENT
Start: 2022-12-20 | End: 2022-12-21 | Stop reason: HOSPADM

## 2022-12-20 RX ORDER — MAG HYDROX/ALUMINUM HYD/SIMETH 200-200-20
30 SUSPENSION, ORAL (FINAL DOSE FORM) ORAL EVERY 4 HOURS PRN
Status: DISCONTINUED | OUTPATIENT
Start: 2022-12-20 | End: 2022-12-21 | Stop reason: HOSPADM

## 2022-12-20 RX ORDER — SODIUM CHLORIDE, SODIUM GLUCONATE, SODIUM ACETATE, POTASSIUM CHLORIDE AND MAGNESIUM CHLORIDE 30; 37; 368; 526; 502 MG/100ML; MG/100ML; MG/100ML; MG/100ML; MG/100ML
INJECTION, SOLUTION INTRAVENOUS CONTINUOUS
Status: DISCONTINUED | OUTPATIENT
Start: 2022-12-20 | End: 2022-12-21 | Stop reason: HOSPADM

## 2022-12-20 RX ORDER — ACETAMINOPHEN 500 MG
1000 TABLET ORAL
Status: CANCELLED | OUTPATIENT
Start: 2022-12-20 | End: 2022-12-20

## 2022-12-20 RX ORDER — ALLOPURINOL 300 MG/1
300 TABLET ORAL DAILY
Status: DISCONTINUED | OUTPATIENT
Start: 2022-12-21 | End: 2022-12-21 | Stop reason: HOSPADM

## 2022-12-20 RX ORDER — MIDAZOLAM HYDROCHLORIDE 1 MG/ML
INJECTION INTRAMUSCULAR; INTRAVENOUS
Status: DISCONTINUED | OUTPATIENT
Start: 2022-12-20 | End: 2022-12-20

## 2022-12-20 RX ORDER — PROCHLORPERAZINE EDISYLATE 5 MG/ML
5 INJECTION INTRAMUSCULAR; INTRAVENOUS EVERY 6 HOURS PRN
Status: DISCONTINUED | OUTPATIENT
Start: 2022-12-20 | End: 2022-12-21 | Stop reason: HOSPADM

## 2022-12-20 RX ORDER — HYDROCODONE BITARTRATE AND ACETAMINOPHEN 5; 325 MG/1; MG/1
1 TABLET ORAL
Status: DISCONTINUED | OUTPATIENT
Start: 2022-12-20 | End: 2022-12-21 | Stop reason: HOSPADM

## 2022-12-20 RX ORDER — BISACODYL 10 MG
10 SUPPOSITORY, RECTAL RECTAL DAILY PRN
Status: DISCONTINUED | OUTPATIENT
Start: 2022-12-20 | End: 2022-12-21 | Stop reason: HOSPADM

## 2022-12-20 RX ADMIN — FAMOTIDINE 20 MG: 10 INJECTION, SOLUTION INTRAVENOUS at 03:12

## 2022-12-20 RX ADMIN — PROPOFOL 150 MG: 10 INJECTION, EMULSION INTRAVENOUS at 04:12

## 2022-12-20 RX ADMIN — HYDROMORPHONE HYDROCHLORIDE 0.2 MG: 2 INJECTION, SOLUTION INTRAMUSCULAR; INTRAVENOUS; SUBCUTANEOUS at 07:12

## 2022-12-20 RX ADMIN — DIPHENHYDRAMINE HYDROCHLORIDE 25 MG: 50 INJECTION INTRAMUSCULAR; INTRAVENOUS at 08:12

## 2022-12-20 RX ADMIN — ALUMINUM HYDROXIDE, MAGNESIUM HYDROXIDE, AND SIMETHICONE 30 ML: 200; 200; 20 SUSPENSION ORAL at 09:12

## 2022-12-20 RX ADMIN — HYDROMORPHONE HYDROCHLORIDE 0.2 MG: 2 INJECTION, SOLUTION INTRAMUSCULAR; INTRAVENOUS; SUBCUTANEOUS at 08:12

## 2022-12-20 RX ADMIN — ROCURONIUM BROMIDE 40 MG: 50 INJECTION INTRAVENOUS at 04:12

## 2022-12-20 RX ADMIN — HYDROCODONE BITARTRATE AND ACETAMINOPHEN 1 TABLET: 10; 325 TABLET ORAL at 09:12

## 2022-12-20 RX ADMIN — PROPOFOL 50 MG: 10 INJECTION, EMULSION INTRAVENOUS at 05:12

## 2022-12-20 RX ADMIN — GLYCOPYRROLATE 0.2 MG: 0.2 INJECTION INTRAMUSCULAR; INTRAVENOUS at 04:12

## 2022-12-20 RX ADMIN — ONDANSETRON 4 MG: 2 INJECTION INTRAMUSCULAR; INTRAVENOUS at 06:12

## 2022-12-20 RX ADMIN — MORPHINE SULFATE 2 MG: 10 INJECTION, SOLUTION INTRAMUSCULAR; INTRAVENOUS at 10:12

## 2022-12-20 RX ADMIN — MIDAZOLAM HYDROCHLORIDE 2 MG: 1 INJECTION, SOLUTION INTRAMUSCULAR; INTRAVENOUS at 04:12

## 2022-12-20 RX ADMIN — ACETAMINOPHEN 1000 MG: 10 INJECTION, SOLUTION INTRAVENOUS at 06:12

## 2022-12-20 RX ADMIN — ACETAMINOPHEN 1000 MG: 500 TABLET ORAL at 03:12

## 2022-12-20 RX ADMIN — PREGABALIN 75 MG: 75 CAPSULE ORAL at 09:12

## 2022-12-20 RX ADMIN — ONDANSETRON 4 MG: 4 TABLET, ORALLY DISINTEGRATING ORAL at 03:12

## 2022-12-20 RX ADMIN — REMIFENTANIL HYDROCHLORIDE 0.2 MCG/KG/MIN: 1 INJECTION, POWDER, LYOPHILIZED, FOR SOLUTION INTRAVENOUS at 04:12

## 2022-12-20 RX ADMIN — PROPOFOL 150 MCG/KG/MIN: 10 INJECTION, EMULSION INTRAVENOUS at 04:12

## 2022-12-20 RX ADMIN — METOCLOPRAMIDE 10 MG: 5 INJECTION, SOLUTION INTRAMUSCULAR; INTRAVENOUS at 03:12

## 2022-12-20 RX ADMIN — SODIUM CHLORIDE, SODIUM GLUCONATE, SODIUM ACETATE, POTASSIUM CHLORIDE AND MAGNESIUM CHLORIDE: 526; 502; 368; 37; 30 INJECTION, SOLUTION INTRAVENOUS at 04:12

## 2022-12-20 RX ADMIN — GABAPENTIN 300 MG: 300 CAPSULE ORAL at 03:12

## 2022-12-20 RX ADMIN — FENTANYL CITRATE 100 MCG: 50 INJECTION, SOLUTION INTRAMUSCULAR; INTRAVENOUS at 04:12

## 2022-12-20 RX ADMIN — SODIUM CHLORIDE: 9 INJECTION, SOLUTION INTRAVENOUS at 09:12

## 2022-12-20 RX ADMIN — LIDOCAINE HYDROCHLORIDE 40 MG: 20 INJECTION, SOLUTION EPIDURAL; INFILTRATION; INTRACAUDAL; PERINEURAL at 04:12

## 2022-12-20 RX ADMIN — MUPIROCIN: 20 OINTMENT TOPICAL at 09:12

## 2022-12-20 RX ADMIN — DEXAMETHASONE SODIUM PHOSPHATE 8 MG: 4 INJECTION, SOLUTION INTRA-ARTICULAR; INTRALESIONAL; INTRAMUSCULAR; INTRAVENOUS; SOFT TISSUE at 05:12

## 2022-12-20 RX ADMIN — CLINDAMYCIN PHOSPHATE 900 MG: 900 INJECTION, SOLUTION INTRAVENOUS at 05:12

## 2022-12-20 NOTE — ANESTHESIA PREPROCEDURE EVALUATION
12/20/2022  Farzad Lockwood is a 37 y.o., male.  he began to have neck pain that was of insidious onset.  It has been a few years that he has had pain going into the left upper extremity.  Seven months ago, he began with pain into the right arm.  He has undergone injections with Dr. Sales which were not of significant benefit.  He underwent a course of physical therapy.  This was stopped due to an increase in his symptoms.  He is not able to play stringed instruments due to pain.  His arms fatigue easily, and have a weird feeling while playing.  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  He feels he has exhausted conservative measures.\  Diagnosis:        Cervical spondylosis with radiculopathy       Cervical spinal stenosis       (Cervical spondylosis with radiculopathy [M47.22])       (Cervical spinal stenosis [M48.02])        He comes to [facility] OR for the noted procedure under GETA with Crawford/videoscope intubation, 2 IV's. He will undergo neuromonitoring.  Procedure:   REPLACEMENT, INTERVERTEBRAL DISC, CERVICAL, TOTAL (Spine Cervical)      PMHx:  Other Medical History   GERD (gastroesophageal reflux disease) Gout, unspecified   Brachial neuritis Hypothyroidism, unspecified   ADHD (attention deficit hyperactivity disorder) Neuropathy   Anxiety disorder, unspecified Insomnia   Bipolar disorder, unspecified Hypercholesteremia   Osteopenia Neck pain   Cervical spondylosis with radiculopathy Cervical spinal stenosis   Head pain        Problem List  Current as of 12/20/22 1250  Vitamin D deficiency Postherpetic neuralgia   Obsessive-compulsive disorder IgG4 related disease   Hypothyroidism Gout   Gastroesophageal reflux disease Disorder of autonomic nervous system   Bipolar I disorder Attention deficit hyperactivity disorder (ADHD)   Arthralgia Erectile dysfunction  "  Generalized anxiety disorder Wellness examination   Cervical spondylosis with radiculopathy Cervical stenosis of spinal canal           PSHx:  Surgical History    APPENDECTOMY CHOLECYSTECTOMY   Radioactive iodine-induced hypothyroidism RHINOPLASTY   LASIK COLONOSCOPY   ESOPHAGOGASTRODUODENOSCOPY            Vital signs:  Pre Vitals  Current as of 12/20/22 1250  BP: 122/84 Pulse: 71   Resp: 19 SpO2: 96   Temp: 36.9 °C (98.5 °F)   Height: 5' 8" (1.727 m) (12/05/22) Weight: 72.6 kg (160 lb) (12/05/22)   BMI: 24.3 IBW: 68.4 kg (150 lb 12.1 oz)   Last edited 12/20/22 0943 by CHEYANNE        Lab Data:          EKG:          Pre-op Assessment    I have reviewed the Patient Summary Reports.     I have reviewed the Nursing Notes. I have reviewed the NPO Status.   I have reviewed the Medications.     Review of Systems  Anesthesia Hx:  No problems with previous Anesthesia    Social:  Non-Smoker    Hepatic/GI:   GERD    Musculoskeletal:   Arthritis     Neurological:   Neuromuscular Disease, Headaches    Endocrine:   Hypothyroidism    Psych:   Psychiatric History          Physical Exam  General: Well nourished, Cooperative, Alert and Oriented    Airway:  Mallampati: I   Mouth Opening: Normal  TM Distance: Normal  Neck ROM: Extension Decreased, Extension Painful    Dental:  Intact    Chest/Lungs:  Clear to auscultation, Normal Respiratory Rate    Heart:  Rate: Normal  Rhythm: Regular Rhythm  Sounds: Normal    Abdomen:  Normal, Soft, Nontender        Anesthesia Plan  Type of Anesthesia, risks & benefits discussed:    Anesthesia Type: Gen ETT  Intra-op Monitoring Plan: Standard ASA Monitors  Post Op Pain Control Plan: IV/PO Opioids PRN and multimodal analgesia  Induction:  IV and Inhalation  Airway Plan: Video  Informed Consent: Informed consent signed with the Patient and all parties understand the risks and agree with anesthesia plan.  All questions answered. Patient consented to blood products? Yes  ASA Score: 2  Day of Surgery " Review of History & Physical: H&P Update referred to the surgeon/provider.  Anesthesia Plan Notes: GETA w/ arterial line and 2 peripheral IV's    Ready For Surgery From Anesthesia Perspective.     .

## 2022-12-20 NOTE — ANESTHESIA PROCEDURE NOTES
Arterial    Diagnosis: Cervical Myelopathy    Patient location during procedure: done in OR  Procedure start time: 12/20/2022 4:42 AM  Timeout: 12/20/2022 4:40 AM  Procedure end time: 12/20/2022 4:44 AM    Staffing  Authorizing Provider: Terrance Adams MD  Performing Provider: Dean Bond CRNA    Anesthesiologist was present at the time of the procedure.    Preanesthetic Checklist  Completed: patient identified, IV checked, site marked, risks and benefits discussed, surgical consent, monitors and equipment checked, pre-op evaluation, timeout performed and anesthesia consent givenArterial  Skin Prep: chlorhexidine gluconate and isopropyl alcohol  Local Infiltration: lidocaine  Orientation: right  Location: radial    Catheter Size: 20 G  Catheter placement by Anatomical landmarks. Heme positive aspiration all ports. Insertion Attempts: 1  Assessment  Dressing: secured with tape and tegaderm  Patient: Tolerated well

## 2022-12-20 NOTE — ANESTHESIA PROCEDURE NOTES
Intubation    Date/Time: 12/20/2022 4:34 PM  Performed by: Dean Bond CRNA  Authorized by: Terrance Adams MD     Intubation:     Induction:  Intravenous    Intubated:  Postinduction    Mask Ventilation:  Easy with oral airway    Attempts:  1    Attempted By:  CRNA    Method of Intubation:  Direct    Blade:  Crawford 3    Laryngeal View Grade: Grade IIA - cords partially seen      Difficult Airway Encountered?: No      Complications:  None    Airway Device:  Oral endotracheal tube    Airway Device Size:  7.5    Style/Cuff Inflation:  Cuffed (inflated to minimal occlusive pressure)    Inflation Amount (mL):  6    Tube secured:  21    Secured at:  The lips    Placement Verified By:  Capnometry    Complicating Factors:  None    Findings Post-Intubation:  BS equal bilateral

## 2022-12-20 NOTE — BRIEF OP NOTE
[facility] - Periop Services  Brief Operative Note    SUMMARY     Surgery Date: 12/20/2022     Surgeon(s) and Role:     * Austin Denise MD - Primary     * Betty Haley [facility] - Assisting        Pre-op Diagnosis:  Cervical spondylosis with radiculopathy and myelopathy     Post-op Diagnosis:  Post-Op Diagnosis Codes:     * Cervical spondylosis with radiculopathy and myelopathy    Procedure(s) (LRB):  REPLACEMENT, INTERVERTEBRAL DISC, CERVICAL, TOTAL (N/A)  C5-6 TDR (Prodisc vivo, 5mm large)  C6-7 TDR (Prodisc vivo, 5mm large)  Microscope, c-arm, NTI (MEPs)  MICKY x1    Anesthesia: General    Operative Findings: Patient tolerated procedure well and was transferred to PACU.     Estimated Blood Loss: 50ml    Estimated Blood Loss has been documented.         Specimens:   Specimen (24h ago, onward)      None            [id number]

## 2022-12-21 VITALS
WEIGHT: 160 LBS | OXYGEN SATURATION: 99 % | BODY MASS INDEX: 24.25 KG/M2 | DIASTOLIC BLOOD PRESSURE: 79 MMHG | SYSTOLIC BLOOD PRESSURE: 132 MMHG | HEART RATE: 81 BPM | HEIGHT: 68 IN | RESPIRATION RATE: 18 BRPM | TEMPERATURE: 98 F

## 2022-12-21 PROCEDURE — 96376 TX/PRO/DX INJ SAME DRUG ADON: CPT

## 2022-12-21 PROCEDURE — 63600175 PHARM REV CODE 636 W HCPCS: Performed by: NURSE PRACTITIONER

## 2022-12-21 PROCEDURE — 25000003 PHARM REV CODE 250: Performed by: NURSE PRACTITIONER

## 2022-12-21 PROCEDURE — G0378 HOSPITAL OBSERVATION PER HR: HCPCS

## 2022-12-21 PROCEDURE — 96365 THER/PROPH/DIAG IV INF INIT: CPT

## 2022-12-21 PROCEDURE — 96366 THER/PROPH/DIAG IV INF ADDON: CPT

## 2022-12-21 RX ORDER — METHOCARBAMOL 1000 MG/1
1000 TABLET, COATED ORAL EVERY 8 HOURS PRN
Qty: 30 TABLET | Refills: 2 | Status: SHIPPED | OUTPATIENT
Start: 2022-12-21 | End: 2022-12-31

## 2022-12-21 RX ORDER — METHYLPREDNISOLONE 4 MG/1
TABLET ORAL
Qty: 21 EACH | Refills: 0 | Status: SHIPPED | OUTPATIENT
Start: 2022-12-21 | End: 2023-01-11

## 2022-12-21 RX ADMIN — HYDROCODONE BITARTRATE AND ACETAMINOPHEN 1 TABLET: 10; 325 TABLET ORAL at 06:12

## 2022-12-21 RX ADMIN — ALUMINUM HYDROXIDE, MAGNESIUM HYDROXIDE, AND SIMETHICONE 30 ML: 200; 200; 20 SUSPENSION ORAL at 06:12

## 2022-12-21 RX ADMIN — CLINDAMYCIN IN 5 PERCENT DEXTROSE 600 MG: 12 INJECTION, SOLUTION INTRAVENOUS at 04:12

## 2022-12-21 RX ADMIN — SUCRALFATE 1 G: 1 TABLET ORAL at 11:12

## 2022-12-21 RX ADMIN — CLINDAMYCIN IN 5 PERCENT DEXTROSE 600 MG: 12 INJECTION, SOLUTION INTRAVENOUS at 01:12

## 2022-12-21 RX ADMIN — MUPIROCIN: 20 OINTMENT TOPICAL at 08:12

## 2022-12-21 RX ADMIN — ALUMINUM HYDROXIDE, MAGNESIUM HYDROXIDE, AND SIMETHICONE 30 ML: 200; 200; 20 SUSPENSION ORAL at 10:12

## 2022-12-21 RX ADMIN — HYDROCODONE BITARTRATE AND ACETAMINOPHEN 1 TABLET: 10; 325 TABLET ORAL at 01:12

## 2022-12-21 RX ADMIN — METHOCARBAMOL 1000 MG: 500 TABLET ORAL at 10:12

## 2022-12-21 RX ADMIN — HYDROCODONE BITARTRATE AND ACETAMINOPHEN 1 TABLET: 10; 325 TABLET ORAL at 10:12

## 2022-12-21 RX ADMIN — PREGABALIN 75 MG: 75 CAPSULE ORAL at 08:12

## 2022-12-21 RX ADMIN — THYROID, PORCINE 15 MG: 15 TABLET ORAL at 06:12

## 2022-12-21 RX ADMIN — CLINDAMYCIN IN 5 PERCENT DEXTROSE 600 MG: 12 INJECTION, SOLUTION INTRAVENOUS at 11:12

## 2022-12-21 RX ADMIN — MORPHINE SULFATE 2 MG: 10 INJECTION, SOLUTION INTRAMUSCULAR; INTRAVENOUS at 08:12

## 2022-12-21 RX ADMIN — SUCRALFATE 1 G: 1 TABLET ORAL at 06:12

## 2022-12-21 RX ADMIN — MORPHINE SULFATE 2 MG: 10 INJECTION, SOLUTION INTRAMUSCULAR; INTRAVENOUS at 11:12

## 2022-12-21 RX ADMIN — SUCRALFATE 1 G: 1 TABLET ORAL at 01:12

## 2022-12-21 RX ADMIN — PANTOPRAZOLE SODIUM 40 MG: 40 TABLET, DELAYED RELEASE ORAL at 08:12

## 2022-12-21 NOTE — DISCHARGE INSTRUCTIONS
-MICKY drain to full compression. Empty drain and record output every 4-6hrs or as needed to maintain compression on bulb. Call the office in the morning with drain output.  -OK to shower and get incision wet. Do not submerge incision under water.   -Continue with a soft diet after discharge. Notify Office with any swallowing difficulty or coughing when eating/drinking  -Soft collar as needed for comfort  -No heavy lifting  -Call the office with any new/worsening pain, numbness, weakness or incision issues.   -OK to resume any medications that were stopped for surgery on POD #5 (12/25/2022)  -Start Aleve twice a day x 3 weeks

## 2022-12-21 NOTE — NURSING
Nurses Note -- 4 Eyes      12/21/2022   3:54 AM      Skin assessed during: Admit      [x] No Pressure Injuries Present    []Prevention Measures Documented      [] Yes- Altered Skin Integrity Present or Discovered   [] LDA Added if Not in Epic (Describe Wound)   [] New Altered Skin Integrity was Present on Admit and Documented in LDA   [] Wound Image Taken    Wound Care Consulted? No    Attending Nurse:  Neal Palmer LPN     Second RN/Staff Member:  Shannon Mott RN

## 2022-12-21 NOTE — TRANSFER OF CARE
"Anesthesia Transfer of Care Note    Patient: Farzad Lockwood    Procedure(s) Performed: Procedure(s) (LRB):  REPLACEMENT, INTERVERTEBRAL DISC, CERVICAL, TOTAL (N/A)    Patient location: PACU    Anesthesia Type: general    Transport from OR: Transported from OR on room air with adequate spontaneous ventilation    Post pain: adequate analgesia    Post assessment: no apparent anesthetic complications    Post vital signs: stable    Level of consciousness: responds to stimulation and sedated    Nausea/Vomiting: no nausea/vomiting    Complications: none    Transfer of care protocol was followed      Last vitals:   Visit Vitals  /67   Pulse 71   Temp 36.9 °C (98.5 °F) (Oral)   Resp 19   Ht 5' 8" (1.727 m)   Wt 72.6 kg (160 lb)   SpO2 96%   BMI 24.33 kg/m²     "

## 2022-12-21 NOTE — HOSPITAL COURSE
The patient was taken to the operating room on 12/20/2022 for C5-6, C6-7 total disc replacement.  He tolerated the procedure well was admitted to the ortho neuro floor postoperatively.  This morning he reports improvement in his hand pain bilaterally.  He has a little bit of pain around bilateral wrists.  He complains of some odynophagia, having to follow food with multiple sips of water.  His pain has been controlled with oral pain medications.  He is ambulating well without issues.  He does not have any new deficits or motor exam.  Incision looks good.  MICKY output 40 mL overnight.  He is stable for discharge home today with MICKY drain.

## 2022-12-21 NOTE — ANESTHESIA POSTPROCEDURE EVALUATION
Anesthesia Post Evaluation    Patient: Farzad Lockwood    Procedure(s) Performed: Procedure(s) (LRB):  REPLACEMENT, INTERVERTEBRAL DISC, CERVICAL, TOTAL (N/A)    Final Anesthesia Type: general      Patient location during evaluation: PACU  Patient participation: Yes- Able to Participate  Level of consciousness: awake and alert  Post-procedure vital signs: reviewed and stable  Pain management: adequate  Airway patency: patent  JESSICA mitigation strategies: Multimodal analgesia  PONV status at discharge: No PONV  Anesthetic complications: no      Cardiovascular status: blood pressure returned to baseline and hemodynamically stable  Respiratory status: unassisted  Hydration status: euvolemic  Follow-up not needed.          Vitals Value Taken Time   /88 12/20/22 1945   Temp 36.1 °C (97 °F) 12/20/22 1907   Pulse 79 12/20/22 1949   Resp 18 12/20/22 1950   SpO2 97 % 12/20/22 1948   Vitals shown include unvalidated device data.      Event Time   Out of Recovery 20:00:00         Pain/Trisha Score: Pain Rating Prior to Med Admin: 5 (12/20/2022  8:00 PM)  Trisha Score: 9 (12/20/2022  8:00 PM)

## 2022-12-21 NOTE — DISCHARGE SUMMARY
[facility]  Neurosurgery  Discharge Summary      Patient Name: Farzad Lockwood  MRN: [id number]  Admission Date: 12/20/2022  Hospital Length of Stay: 0 days  Discharge Date and Time:  12/21/2022 11:26 AM  Attending Physician: Austin Denise MD   Discharging Provider: Betty Haley [facility]  Primary Care Provider: Juan M Conti MD    HPI:   Farzad Lockwood is a 37 y.o. male who presents for follow up appointment.  The patient has had an updated cervical MRI, cervical CT, and bone density study.  He was previously seen on 10/13/2022.  At that time, the pain was worse on the left than right.  Today, he complains of neck pain with pain radiating into the right greater than left arm along the C6 distribution.  Overall, his pain is progressively worsening.  There is tingling and numbness through the same distribution.  Subjectively, he has weakness in both arms.     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  Four years ago, he began to have neck pain that was of insidious onset.  It has been a few years that he has had pain going into the left upper extremity.  Seven months ago, he began with pain into the right arm.  He has undergone injections with Dr. Sales which were not of significant benefit.  He underwent a course of physical therapy.  This was stopped due to an increase in his symptoms.  Massage has helped his symptoms for short period of time.  He is having to greatly restrict his activities secondary to pain.  He is only able to play the piano for 10 minutes.  He is not able to play stringed instruments due to pain.  His arms fatigue easily, and have a weird feeling while playing.  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  He feels he has exhausted conservative  measures.  He reports to have difficulty initiating flow with voiding.  He does not have disturbances in bowel function.  She does not have difficulty with his balance, but notes dizziness, lightheaded feeling when lying down.       Procedure(s) (LRB):  REPLACEMENT, INTERVERTEBRAL DISC, CERVICAL, TOTAL (N/A)   C5-6 TDR (Prodisc vivo, 5mm large)  C6-7 TDR (Prodisc vivo, 5mm large)  Microscope, c-arm, NTI (MEPs)  MICKY x1    Hospital Course: The patient was taken to the operating room on 12/20/2022 for C5-6, C6-7 total disc replacement.  He tolerated the procedure well was admitted to the ortho neuro floor postoperatively.  This morning he reports improvement in his hand pain bilaterally.  He has a little bit of pain around bilateral wrists.  He complains of some odynophagia, having to follow food with multiple sips of water.  His pain has been controlled with oral pain medications.  He is ambulating well without issues.  He does not have any new deficits or motor exam.  Incision looks good.  MICKY output 40 mL overnight.  He is stable for discharge home today with MICKY drain.      Pending Diagnostic Studies:       None          Final Active Diagnoses:    Diagnosis Date Noted POA    PRINCIPAL PROBLEM:  Cervical spondylosis with myelopathy and radiculopathy [M47.12, M47.22] 10/13/2022 Yes      Problems Resolved During this Admission:      Discharged Condition: good     Disposition: Home or Self Care    Follow Up: 2 weeks post-op - office will reschedule 12/28 appt   Follow-up Information       Austin Denise MD Follow up in 2 week(s).    Specialty: Neurosurgery  Why: Office will reschedule appt on 12/28 and XR appointment  Contact information:  [address] [place] [address]   [address]  [facility] [address] [phone]                           Patient Instructions:   -MICKY drain to full compression. Empty drain and record output every 4-6hrs or as needed to maintain compression on bulb. Call the office in the morning with drain  output.  -OK to shower and get incision wet. Do not submerge incision under water.   -Continue with a soft diet after discharge. Notify Office with any swallowing difficulty or coughing when eating/drinking  -Soft collar as needed for comfort  -No heavy lifting  -Call the office with any new/worsening pain, numbness, weakness or incision issues.   -OK to resume any medications that were stopped for surgery on POD #5 (12/25/2022)  -Start Aleve twice a day x 3 weeks      Medications:  Reconciled Home Medications:      Medication List        START taking these medications      methocarbamoL 1,000 mg Tab  Take 1,000 mg by mouth every 8 (eight) hours as needed (muscle spasms).     methylPREDNISolone 4 mg tablet  Commonly known as: MEDROL DOSEPACK  use as directed            CHANGE how you take these medications      pregabalin 75 MG capsule  Commonly known as: LYRICA  Take 1 capsule (75 mg total) by mouth 3 (three) times daily.  What changed: when to take this            CONTINUE taking these medications      allopurinoL 100 MG tablet  Commonly known as: ZYLOPRIM  Take 3 tablets (300 mg total) by mouth once daily.     * ARMOUR THYROID 120 mg Tab  Generic drug: thyroid (pork)  Take 1 tablet by mouth once daily.     * ARMOUR THYROID 15 mg Tab  Generic drug: thyroid (pork)  Take 1 tablet (15 mg total) by mouth once daily at 6am.     CALCIUM+D ORAL  Take 0.5 tablets by mouth 2 (two) times a day. Calcium 1000 mg Vit D 40mcg     clonazePAM 0.5 MG tablet  Commonly known as: KlonoPIN  Take 0.5 mg by mouth 2 (two) times daily as needed.     dextroamphetamine-amphetamine 20 MG 24 hr capsule  Commonly known as: ADDERALL XR  Take 10-20 mg by mouth every morning.     HYDROcodone-acetaminophen 7.5-325 mg per tablet  Commonly known as: NORCO  Take 1 tablet by mouth every 4 (four) hours as needed for Pain.     lamoTRIgine 200 MG tablet  Commonly known as: LAMICTAL  TAKE 1 TABLET BY MOUTH EVERY DAY     MAGNESIUM CHLORIDE ORAL  Take 1  tablet by mouth 2 (two) times a day. Magnesium 80mg     omeprazole 40 MG capsule  Commonly known as: PRILOSEC  TAKE 1 CAPSULE BY MOUTH EVERY DAY     polyethylene glycol 17 gram/dose powder  Commonly known as: GLYCOLAX  Take 17 g by mouth once daily.     QUEtiapine 100 MG Tab  Commonly known as: SEROQUEL  Take 100 mg by mouth nightly as needed.     tadalafiL 10 MG tablet  Commonly known as: CIALIS  Take 1-2 tablets by mouth daily as needed.     tamsulosin 0.4 mg Cap  Commonly known as: FLOMAX  TAKE 1 CAPSULE BY MOUTH ONCE DAILY           * This list has 2 medication(s) that are the same as other medications prescribed for you. Read the directions carefully, and ask your doctor or other care provider to review them with you.                STOP taking these medications      baclofen 10 MG tablet  Commonly known as: LIORESAL     meloxicam 15 MG tablet  Commonly known as: MOBIC     naltrexone capsule              JUDSON AlexanderCNMARISOL-BC  Neurosurgery  [facility] - 9th Floor Med Surg

## 2022-12-22 DIAGNOSIS — G89.18 ACUTE POST-OPERATIVE PAIN: Primary | ICD-10-CM

## 2022-12-22 DIAGNOSIS — M47.22 CERVICAL SPONDYLOSIS WITH RADICULOPATHY: ICD-10-CM

## 2022-12-22 RX ORDER — HYDROCODONE BITARTRATE AND ACETAMINOPHEN 7.5; 325 MG/1; MG/1
1-2 TABLET ORAL EVERY 4 HOURS PRN
Qty: 60 TABLET | Refills: 0 | Status: SHIPPED | OUTPATIENT
Start: 2022-12-22 | End: 2023-03-27 | Stop reason: SDUPTHER

## 2022-12-22 NOTE — TELEPHONE ENCOUNTER
Discussed drain with Henny, she said okay to pull.  I spoke with the patient and gave him instructions.  I let him know that medication refill would be sent in this evening.  He will call through the service with any problems.

## 2022-12-22 NOTE — TELEPHONE ENCOUNTER
I checked Rx history.  Rx for Norco 7.5-325mg was filled on 11/30.  He would be due.  Nothing from anyone else.  I updated the prescription to say 1-2 prn, may need to adjust before sending.  I will check on drain output when I call about meds.

## 2022-12-22 NOTE — TELEPHONE ENCOUNTER
María Elena called the patient to change his post op appointment from [place] to [facility] schedule on 1/4/23.  When speaking to him he asked for a refill on his pain medication because he did not want to run out over the weekend.  He states that he has Norco 7.5/325mg and he is taking 6 in a 24 hour period.  He confirmed that he is having to wake up in the middle of the night to take them as well.  In his chart I see that he was prescribed Norco 7.5/3255mg 1 Q4hrs #40 on 11/21/22.  He states that he has 10 left.  He complains of pain at the front incisions and aching pain in the back of his neck.  He rates the pain an 8/10 and states that it is worse at night and first thing in the morning.  He stated that yesterday Dr. Denise told him to take 2 at a time, but he is afraid to run out too soon.  He is also taking Aleve and Robaxin 2 every 8 hours.  He is s/p C5-6 C6-7 TDR on 12/20/22.  He is asking that the script be called into [facility] on [address] and [address] since it is closer to his parents house where he is currently staying.  BH

## 2022-12-22 NOTE — TELEPHONE ENCOUNTER
Patient called to ask about instructions for pulling drain.  He has not emptied it since he left the hospital.  He said he did not know that he was supposed to do so.  He had put out 40ml overnight yesterday prior to discharge.  It looks like he went home around 12:50.  He has not emptied it since he got home.  He emptied while we were on the phone.  Measured about 20cc at 15:31 (about 27hrs since last emptied).  I told him to re-compress the bulb before closing the top and leave it in for now.  I went over instructions that are listed in the discharge paperwork again regarding drain care.  Should he leave it in or go ahead and pull?    Also, see below regarding medication.

## 2022-12-23 NOTE — OP NOTE
DATE OF OPERATION:   December 20, 2022     PREOPERATIVE DIAGNOSIS:   1. Cervical spondylosis and disc herniation with myeloradiculopathy     POSTOPERATIVE DIAGNOSIS:   1. Cervical spondylosis and disc herniation with myeloradiculopathy     SURGEON:  Austin Denise M.D.    ASSISTANT: LUKAS Baires     PROCEDURE:   1.  C5-6 disc arthroplasty with the ProDisc Vivo Total Disc Replacement system (5 mm large footprint implant)  2.  C6-7 disc arthroplasty with the ProDisc Vivo Total Disc Replacement system (5 mm large footprint implant)  3.  Microdissection for spinal procedure    ANESTHESIA:   General endotracheal     BLOOD LOSS:   50 cc     SPECIMEN(s):   None     COMPLICATIONS:   None     HISTORY:   The patient is a 37-year-old gentleman with intractable neck shoulder and arm pain with numbness and weakness.  He also had lower extremity numbness.  Imaging study showed significant disc degeneration, disc bulging and osteophytes producing both central and foraminal stenosis at C5-6 greater than C6-7.  Options were discussed and, after conservative management failed, surgery was elected. The patient understood and accepted the nature of this surgery as well as its attendant risks.     FINDINGS:   As expected, there was significant disc degeneration and moderate spondylosis producing both central and foraminal stenosis.  There was excellent nerve root and thecal sac decompression as well as excellent fit of the disc devices at the completion of the procedure.  The patient tolerated the procedure well.      PROCEDURE IN DETAIL:   After endotracheal intubation and induction of general anesthesia, the patient's head was placed on a doughnut and a roll was placed under the shoulders. Intraoperative neuromonitoring electrodes were put into place, and both EMG, SSEP and, when appropriate, motor evoked potential monitoring were carried out continuously throughout the procedure. The neck was prepped and draped in the  usual fashion.  The patient received intravenous antibiotics prior to the start of the procedure. Intraoperative C-arm fluoroscopy was used to both localize the incision and guide the placement of the graft and hardware. An incision was marked out at the appropriate location and infiltrated with local anesthetic containing epinephrine. The incision was carried down through the skin and subcutaneous tissues with a knife. Unipolar cautery was used to incise the platysma. Circumferential subplatysmal dissection was carried out to allow better retraction. Then the fascial sheath was divided sharply, and sharp and blunt dissection were carried out medial to the sternocleidomastoid and carotid sheath and lateral to the trachea and esophagus down to the level of the prevertebral fascia.  After confirming correct interspace localization, the longus colli musculature was undercut bilaterally at the operative levels. The self-retaining retractor system was put into place, and the air was released from the endotracheal tube cuff to minimize pressure along the recurrent laryngeal nerve.       The operating microscope was brought into place. The disc was incised sharply with a knife. Care was taken to wax any exposed bone above and below the incident level. Disc was removed with curettes and pituitary rongeurs down to the posterior endplates. The posterior longitudinal ligament was released, and then a balanced foraminal decompression was carried out with attempt to preserve as much of the uncinate process as possible. An appropriate sized trial was put into place nearly flush with the posterior edge of the endplate and an AP x-ray was taken to ensure midline placement and maximal coverage of the interspace. Then the appropriate sized implant was tapped into place and positioned optimally.  The inserting device was removed and AP and lateral x-rays were taken to confirm appropriate placement. Areas of raw bone surface were  waxed.  Attention was then paid to the next level where essentially the same procedure was carried out.     The wound was irrigated copiously with antibiotic irrigation followed by irrigation with half-strength peroxide.  The wound was then closed with 3-0 Vicryl for the platysma and a running 4-0 Monocryl suture for the subcuticular. Dermabond glue was used for the skin edges. The patient was then taken to the post-anesthetic care unit in satisfactory condition with correct sponge and needle counts.

## 2023-01-04 ENCOUNTER — OFFICE VISIT (OUTPATIENT)
Dept: NEUROSURGERY | Facility: CLINIC | Age: 38
End: 2023-01-04
Payer: COMMERCIAL

## 2023-01-04 ENCOUNTER — HOSPITAL ENCOUNTER (OUTPATIENT)
Dept: RADIOLOGY | Facility: HOSPITAL | Age: 38
Discharge: HOME | End: 2023-01-04
Attending: PHYSICIAN ASSISTANT
Payer: COMMERCIAL

## 2023-01-04 VITALS
SYSTOLIC BLOOD PRESSURE: 118 MMHG | WEIGHT: 165 LBS | DIASTOLIC BLOOD PRESSURE: 78 MMHG | RESPIRATION RATE: 20 BRPM | HEART RATE: 83 BPM | HEIGHT: 68 IN | BODY MASS INDEX: 25.01 KG/M2

## 2023-01-04 DIAGNOSIS — E78.5 DYSLIPIDEMIA: ICD-10-CM

## 2023-01-04 DIAGNOSIS — M50.10 DISPLACEMENT OF CERVICAL INTERVERTEBRAL DISC WITH RADICULOPATHY: Primary | ICD-10-CM

## 2023-01-04 PROCEDURE — 1159F MED LIST DOCD IN RCRD: CPT | Mod: CPTII,,, | Performed by: PHYSICIAN ASSISTANT

## 2023-01-04 PROCEDURE — 3008F BODY MASS INDEX DOCD: CPT | Mod: CPTII,,, | Performed by: PHYSICIAN ASSISTANT

## 2023-01-04 PROCEDURE — 72040 X-RAY EXAM NECK SPINE 2-3 VW: CPT | Mod: TC

## 2023-01-04 PROCEDURE — 99024 POSTOP FOLLOW-UP VISIT: CPT | Mod: ,,, | Performed by: PHYSICIAN ASSISTANT

## 2023-01-04 PROCEDURE — 1160F PR REVIEW ALL MEDS BY PRESCRIBER/CLIN PHARMACIST DOCUMENTED: ICD-10-PCS | Mod: CPTII,,, | Performed by: PHYSICIAN ASSISTANT

## 2023-01-04 PROCEDURE — 1160F RVW MEDS BY RX/DR IN RCRD: CPT | Mod: CPTII,,, | Performed by: PHYSICIAN ASSISTANT

## 2023-01-04 PROCEDURE — 3078F DIAST BP <80 MM HG: CPT | Mod: CPTII,,, | Performed by: PHYSICIAN ASSISTANT

## 2023-01-04 PROCEDURE — 3074F PR MOST RECENT SYSTOLIC BLOOD PRESSURE < 130 MM HG: ICD-10-PCS | Mod: CPTII,,, | Performed by: PHYSICIAN ASSISTANT

## 2023-01-04 PROCEDURE — 3074F SYST BP LT 130 MM HG: CPT | Mod: CPTII,,, | Performed by: PHYSICIAN ASSISTANT

## 2023-01-04 PROCEDURE — 3078F PR MOST RECENT DIASTOLIC BLOOD PRESSURE < 80 MM HG: ICD-10-PCS | Mod: CPTII,,, | Performed by: PHYSICIAN ASSISTANT

## 2023-01-04 PROCEDURE — 3008F PR BODY MASS INDEX (BMI) DOCUMENTED: ICD-10-PCS | Mod: CPTII,,, | Performed by: PHYSICIAN ASSISTANT

## 2023-01-04 PROCEDURE — 1159F PR MEDICATION LIST DOCUMENTED IN MEDICAL RECORD: ICD-10-PCS | Mod: CPTII,,, | Performed by: PHYSICIAN ASSISTANT

## 2023-01-04 PROCEDURE — 99024 PR POST-OP FOLLOW-UP VISIT: ICD-10-PCS | Mod: ,,, | Performed by: PHYSICIAN ASSISTANT

## 2023-01-04 RX ORDER — CETIRIZINE HYDROCHLORIDE 10 MG/1
TABLET ORAL
COMMUNITY
Start: 2022-07-30 | End: 2024-03-25

## 2023-01-04 RX ORDER — METHOCARBAMOL 500 MG/1
1000 TABLET, FILM COATED ORAL EVERY 8 HOURS PRN
COMMUNITY
Start: 2022-12-29 | End: 2023-02-13 | Stop reason: SDUPTHER

## 2023-01-04 NOTE — PROGRESS NOTES
[facility]  History & Physical  Neurosurgery      Farzad Lockwood   [id number]   1985       CHIEF COMPLAINT:  Post op follow up    HPI:  Farzad Lockwood is a 37 y.o. male who presents for 2 week post-operative follow up appointment.  On 12/20/2022, the patient underwent C5-6 and C6-7 total disk arthroplasty.  He has seen a resolution of the preoperative radicular pain and numbness.  He continues with posterior cervical, postoperative, pain that is improving.  He denies difficulty swallowing.  There has been some lower back pain in the past few days.  He feels this is due to him not moving around much.       Past Medical History:   Diagnosis Date    ADHD (attention deficit hyperactivity disorder)     Anxiety disorder, unspecified     Bipolar disorder, unspecified     Brachial neuritis     Cervical spinal stenosis     Cervical spondylosis with radiculopathy     GERD (gastroesophageal reflux disease)     Gout, unspecified     Head pain     Hypercholesteremia     Hypothyroidism, unspecified     Insomnia     Neck pain     Neuropathy     Osteopenia     PONV (postoperative nausea and vomiting)     c dizziness       Past Surgical History:   Procedure Laterality Date    APPENDECTOMY  2007    CHOLECYSTECTOMY      COLONOSCOPY      ESOPHAGOGASTRODUODENOSCOPY      LASIK Bilateral     Radioactive iodine-induced hypothyroidism      RHINOPLASTY      TOTAL REPLACEMENT OF CERVICAL INTERVERTEBRAL DISC N/A 12/20/2022    Procedure: REPLACEMENT, INTERVERTEBRAL DISC, CERVICAL, TOTAL;  Surgeon: Austin Denise MD;  Location: [facility];  Service: Neurosurgery;  Laterality: N/A;  C5-6, C6-7 total disc replacement, ACDF if necessary  NTI       Family History   Problem Relation Age of Onset    Mitral valve prolapse Mother     PONV Mother         c dizziness    Mitral valve prolapse Brother     Cancer Maternal Grandfather     Hypothyroidism Paternal Grandmother        Social History     Socioeconomic History    Marital  status: Significant Other   Tobacco Use    Smoking status: Never    Smokeless tobacco: Never   Substance and Sexual Activity    Alcohol use: Never    Drug use: Never       Review of patient's allergies indicates:   Allergen Reactions    Penicillins Hives    Tramadol Hives     Other reaction(s): NIGHTMARES        Medication List with Changes/Refills   Current Medications    ALLOPURINOL (ZYLOPRIM) 100 MG TABLET    Take 3 tablets (300 mg total) by mouth once daily.    ARMOUR THYROID 120 MG TAB    Take 1 tablet by mouth once daily.    ARMOUR THYROID 15 MG TAB    Take 1 tablet (15 mg total) by mouth once daily at 6am.    CALCIUM CARBONATE/VITAMIN D3 (CALCIUM+D ORAL)    Take 0.5 tablets by mouth 2 (two) times a day. Calcium 1000 mg Vit D 40mcg    CETIRIZINE (ZYRTEC) 10 MG TABLET    as needed.    CLONAZEPAM (KLONOPIN) 0.5 MG TABLET    Take 0.5 mg by mouth 2 (two) times daily as needed.    DEXTROAMPHETAMINE-AMPHETAMINE (ADDERALL XR) 20 MG 24 HR CAPSULE    Take 10-20 mg by mouth every morning.    HYDROCODONE-ACETAMINOPHEN (NORCO) 7.5-325 MG PER TABLET    Take 1-2 tablets by mouth every 4 (four) hours as needed for Pain.    LAMOTRIGINE (LAMICTAL) 200 MG TABLET    TAKE 1 TABLET BY MOUTH EVERY DAY    MAGNESIUM CHLORIDE ORAL    Take 1 tablet by mouth 2 (two) times a day. Magnesium 80mg    METHOCARBAMOL (ROBAXIN) 500 MG TAB    Take 1,000 mg by mouth every 8 (eight) hours as needed.    METHYLPREDNISOLONE (MEDROL DOSEPACK) 4 MG TABLET    use as directed    OMEPRAZOLE (PRILOSEC) 40 MG CAPSULE    TAKE 1 CAPSULE BY MOUTH EVERY DAY    POLYETHYLENE GLYCOL (GLYCOLAX) 17 GRAM/DOSE POWDER    Take 17 g by mouth once daily.    PREGABALIN (LYRICA) 75 MG CAPSULE    Take 1 capsule (75 mg total) by mouth 3 (three) times daily.    QUETIAPINE (SEROQUEL) 100 MG TAB    Take 100 mg by mouth nightly as needed.    TADALAFIL (CIALIS) 10 MG TABLET    Take 1-2 tablets by mouth daily as needed.    TAMSULOSIN (FLOMAX) 0.4 MG CAP    TAKE 1 CAPSULE BY MOUTH  "ONCE DAILY        ROS:    Review of Systems   Constitutional:  Negative for chills and fever.   HENT:  Negative for nosebleeds and sore throat.    Eyes:  Negative for pain and visual disturbance.   Respiratory:  Negative for cough, chest tightness and shortness of breath.    Cardiovascular:  Negative for chest pain.   Gastrointestinal:  Negative for diarrhea, nausea and vomiting.   Genitourinary:  Negative for difficulty urinating, dysuria and hematuria.   Musculoskeletal:  Positive for neck pain. Negative for gait problem and myalgias.   Skin:  Negative for rash.   Neurological:  Negative for dizziness, facial asymmetry, weakness and headaches.   Psychiatric/Behavioral:  Negative for confusion and sleep disturbance. The patient is not nervous/anxious.        Physical Examination:    Vital Signs:  /78 (BP Location: Other (Comment), Patient Position: Sitting)   Pulse 83   Resp 20   Ht 5' 8" (1.727 m)   Wt 74.8 kg (165 lb)   BMI 25.09 kg/m²      General:  Pleasant. Well-nourished. Well-groomed.    Lungs:  Breathing is quiet, non-labored     Musculoskeletal:  Incision is well healed.    Neurological:    Oriented to Person, Place, Time   Strength is good.  Gait is normal.  Coordination is normal    Imaging:  Three views of the cervical spine were obtained on 01/04/2023.  This study shows good, stable position of the interbody devices    ASSESSMENT/PLAN:     1. Displacement of cervical intervertebral disc with radiculopathy  X-Ray Cervical Spine 5 View W Flex Extxt        Overall, the patient is doing very well.  We discussed a gradual increase in his level of activity.  Plan is for him to return to work next week.  He will return for follow-up at 3 months postop with cervical x-rays.      Btety Cheng NP  "

## 2023-01-09 PROBLEM — Z00.00 WELLNESS EXAMINATION: Status: RESOLVED | Noted: 2022-10-10 | Resolved: 2023-01-09

## 2023-01-10 ENCOUNTER — OFFICE VISIT (OUTPATIENT)
Dept: NEUROLOGY | Facility: CLINIC | Age: 38
End: 2023-01-10
Payer: COMMERCIAL

## 2023-01-10 VITALS
BODY MASS INDEX: 25.01 KG/M2 | WEIGHT: 165 LBS | DIASTOLIC BLOOD PRESSURE: 86 MMHG | HEIGHT: 68 IN | SYSTOLIC BLOOD PRESSURE: 140 MMHG

## 2023-01-10 DIAGNOSIS — G62.9 POLYNEUROPATHY: ICD-10-CM

## 2023-01-10 PROCEDURE — 3079F DIAST BP 80-89 MM HG: CPT | Mod: CPTII,S$GLB,, | Performed by: SPECIALIST

## 2023-01-10 PROCEDURE — 3079F PR MOST RECENT DIASTOLIC BLOOD PRESSURE 80-89 MM HG: ICD-10-PCS | Mod: CPTII,S$GLB,, | Performed by: SPECIALIST

## 2023-01-10 PROCEDURE — 99205 OFFICE O/P NEW HI 60 MIN: CPT | Mod: S$GLB,,, | Performed by: SPECIALIST

## 2023-01-10 PROCEDURE — 3008F BODY MASS INDEX DOCD: CPT | Mod: CPTII,S$GLB,, | Performed by: SPECIALIST

## 2023-01-10 PROCEDURE — 99205 PR OFFICE/OUTPT VISIT, NEW, LEVL V, 60-74 MIN: ICD-10-PCS | Mod: S$GLB,,, | Performed by: SPECIALIST

## 2023-01-10 PROCEDURE — 3077F PR MOST RECENT SYSTOLIC BLOOD PRESSURE >= 140 MM HG: ICD-10-PCS | Mod: CPTII,S$GLB,, | Performed by: SPECIALIST

## 2023-01-10 PROCEDURE — 99999 PR PBB SHADOW E&M-EST. PATIENT-LVL III: CPT | Mod: PBBFAC,,, | Performed by: SPECIALIST

## 2023-01-10 PROCEDURE — 1159F MED LIST DOCD IN RCRD: CPT | Mod: CPTII,S$GLB,, | Performed by: SPECIALIST

## 2023-01-10 PROCEDURE — 3008F PR BODY MASS INDEX (BMI) DOCUMENTED: ICD-10-PCS | Mod: CPTII,S$GLB,, | Performed by: SPECIALIST

## 2023-01-10 PROCEDURE — 1159F PR MEDICATION LIST DOCUMENTED IN MEDICAL RECORD: ICD-10-PCS | Mod: CPTII,S$GLB,, | Performed by: SPECIALIST

## 2023-01-10 PROCEDURE — 99999 PR PBB SHADOW E&M-EST. PATIENT-LVL III: ICD-10-PCS | Mod: PBBFAC,,, | Performed by: SPECIALIST

## 2023-01-10 PROCEDURE — 3077F SYST BP >= 140 MM HG: CPT | Mod: CPTII,S$GLB,, | Performed by: SPECIALIST

## 2023-01-10 NOTE — PROGRESS NOTES
Subjective:       Patient ID: Farzad Lockwood is a 37 y.o. male.    Chief Complaint: feet pain     HPI:            NP ref by Dr Conti for neuro cons to jose elias for Polyneuropathy   (Pt states he gets numbness, tingling and achy feeling in B feet w the rt being the worst.   States he has had this on and off for 1 1/2 yrs. States at times it is hard for him to walk. )      notes may also be on facesheet for HPI, ROS, and other sections     Review of Systems  Also reports LUE brachial neuritis   Also had c spine disc replacements with appley C6 and 7 [dec]   He also shared with me some photos of feet joint swelling and rash in feet, etc.           Social History     Socioeconomic History    Marital status: Significant Other   Tobacco Use    Smoking status: Never    Smokeless tobacco: Never   Substance and Sexual Activity    Alcohol use: Never    Drug use: Never     ----------------------------  ADHD (attention deficit hyperactivity disorder)  Anxiety disorder, unspecified  Bipolar disorder, unspecified  Brachial neuritis  Cervical spinal stenosis  Cervical spondylosis with radiculopathy  GERD (gastroesophageal reflux disease)  Gout, unspecified  Head pain  Hypercholesteremia  Hypothyroidism, unspecified  Insomnia  Neck pain  Neuropathy  Osteopenia  PONV (postoperative nausea and vomiting)      Comment:  c dizziness      Current Outpatient Medications:     allopurinoL (ZYLOPRIM) 100 MG tablet, Take 3 tablets (300 mg total) by mouth once daily., Disp: 90 tablet, Rfl: 2    ARMOUR THYROID 120 mg Tab, Take 1 tablet by mouth once daily., Disp: , Rfl:     ARMOUR THYROID 15 mg Tab, Take 1 tablet (15 mg total) by mouth once daily at 6am., Disp: 90 tablet, Rfl: 1    calcium carbonate/vitamin D3 (CALCIUM+D ORAL), Take 0.5 tablets by mouth 2 (two) times a day. Calcium 1000 mg Vit D 40mcg, Disp: , Rfl:     cetirizine (ZYRTEC) 10 MG tablet, as needed., Disp: , Rfl:     clonazePAM (KLONOPIN) 0.5 MG tablet, Take 0.5 mg by mouth 2  "(two) times daily as needed., Disp: , Rfl:     dextroamphetamine-amphetamine (ADDERALL XR) 20 MG 24 hr capsule, Take 10-20 mg by mouth every morning., Disp: , Rfl:     lamoTRIgine (LAMICTAL) 200 MG tablet, TAKE 1 TABLET BY MOUTH EVERY DAY, Disp: 90 tablet, Rfl: 2    MAGNESIUM CHLORIDE ORAL, Take 1 tablet by mouth 2 (two) times a day. Magnesium 80mg, Disp: , Rfl:     omeprazole (PRILOSEC) 40 MG capsule, TAKE 1 CAPSULE BY MOUTH EVERY DAY, Disp: 90 capsule, Rfl: 1    pregabalin (LYRICA) 75 MG capsule, Take 1 capsule (75 mg total) by mouth 3 (three) times daily., Disp: 90 capsule, Rfl: 5    QUEtiapine (SEROQUEL) 100 MG Tab, Take 100 mg by mouth nightly as needed., Disp: , Rfl:     tadalafiL (CIALIS) 10 MG tablet, Take 1-2 tablets by mouth daily as needed., Disp: , Rfl:     tamsulosin (FLOMAX) 0.4 mg Cap, TAKE 1 CAPSULE BY MOUTH ONCE DAILY, Disp: 90 capsule, Rfl: 1    HYDROcodone-acetaminophen (NORCO) 7.5-325 mg per tablet, Take 1-2 tablets by mouth every 4 (four) hours as needed for Pain. (Patient not taking: Reported on 1/10/2023), Disp: 60 tablet, Rfl: 0    methocarbamoL (ROBAXIN) 500 MG Tab, Take 1,000 mg by mouth every 8 (eight) hours as needed., Disp: , Rfl:     methylPREDNISolone (MEDROL DOSEPACK) 4 mg tablet, use as directed (Patient not taking: Reported on 1/4/2023), Disp: 21 each, Rfl: 0    polyethylene glycol (GLYCOLAX) 17 gram/dose powder, Take 17 g by mouth once daily. (Patient not taking: Reported on 1/10/2023), Disp: 595 g, Rfl: 0     Objective:        Exam:   BP (!) 140/86   Ht 5' 8" (1.727 m)   Wt 74.8 kg (165 lb)   BMI 25.09 kg/m²     General Exam  _._unaccompanied  body habitus_ Body mass index is 25.09 kg/m².    mental status_alert and appropriate  oropharynx_Mallampati grade_ 1  neck_  Heart__ RRR  Extremities_ no edema     Neurological:  cortical function__ normal   Speech __ normal   cranial nerves:  CN 2 VF_ok   fundi_ sharp discs   CN 3, 4, 6 EOMs_ok  CN 3, pupils_ok  CN 7_no lower face " asymmetry  CN 8_hearing _ ok   CN 12 tongue_ok    Motor__ ok   tone: normal   muscle stretch reflexes__ trace equal   Vib sens_ normal   Pin sens_ normal   Plantars__ flat   tremor: _ nil   coordination: _ normal   gait_ normal incl toes heels and tandem   Romberg: neg     Neuroimaging:  Images and imaging reports reviewed.  Study / studies: C sp MRI 10/2022  Rads summary: The cervical spinal cord is normal in caliber and signal intensity. EDWINA James, radiologist  B MRI 2008 [facility] normal   My comments: agree brain and cord normal       Labs:      _._ new patient   _.__ multiple issues/ diagnoses or problems  [if not enumerated in note then discussed in encounter but not documented]    complexity of data     _._high _mod   _._ images and reports reviewed:  __ hx obtained from family or accompaniment:   __other studies reviewed   __studies ordered __   _._studies considered or discussed but not ordered __   NCV EMG; repeat brain MRI   __DDx discussed __    Risks    __high _. mod   __ (possible or definite) neurodegenerative condition and inherent progression  _._ (poss or def) autoimmune condition with possibility of flares or unexpected attack  __ (poss or def) seiz d.o. with possib of recurr seiz's   __ cerebrovasc ds with risk of recurrence of stroke  __ CNS meds (and/or) potentially high risk non CNS meds which may cause medical or behavioral side effects  __ fall risk  __ driving discussed   _.._ diagnosis unclear or DDx wide making risk uncertain to high  __other:    MDM/Medical Decision Making     __high  _moderate         Assessment/Plan:       Problem List Items Addressed This Visit    None  Visit Diagnoses       Polyneuropathy    he may have a small fiber neuropathy with or without another autoimmune condition               Other comments/ follow up:        Imaging orders(if any):   No orders of the defined types were placed in this encounter.             Followup __ not sheduled at this time     Silvestre KILPATRICK  MD MAEGAN Means

## 2023-02-13 ENCOUNTER — TELEPHONE (OUTPATIENT)
Dept: NEUROSURGERY | Facility: CLINIC | Age: 38
End: 2023-02-13
Payer: COMMERCIAL

## 2023-02-13 DIAGNOSIS — M47.22 CERVICAL SPONDYLOSIS WITH MYELOPATHY AND RADICULOPATHY: Primary | ICD-10-CM

## 2023-02-13 DIAGNOSIS — M47.12 CERVICAL SPONDYLOSIS WITH MYELOPATHY AND RADICULOPATHY: Primary | ICD-10-CM

## 2023-02-13 RX ORDER — METHOCARBAMOL 500 MG/1
1000 TABLET, FILM COATED ORAL EVERY 8 HOURS PRN
Qty: 30 TABLET | Refills: 2 | Status: SHIPPED | OUTPATIENT
Start: 2023-02-13 | End: 2023-03-27 | Stop reason: SDUPTHER

## 2023-02-23 ENCOUNTER — TELEPHONE (OUTPATIENT)
Dept: NEUROSURGERY | Facility: CLINIC | Age: 38
End: 2023-02-23
Payer: COMMERCIAL

## 2023-02-24 DIAGNOSIS — M47.22 CERVICAL SPONDYLOSIS WITH MYELOPATHY AND RADICULOPATHY: Primary | ICD-10-CM

## 2023-02-24 DIAGNOSIS — M48.02 CERVICAL STENOSIS OF SPINAL CANAL: ICD-10-CM

## 2023-02-24 DIAGNOSIS — M47.12 CERVICAL SPONDYLOSIS WITH MYELOPATHY AND RADICULOPATHY: Primary | ICD-10-CM

## 2023-03-27 ENCOUNTER — OFFICE VISIT (OUTPATIENT)
Dept: NEUROSURGERY | Facility: CLINIC | Age: 38
End: 2023-03-27
Payer: COMMERCIAL

## 2023-03-27 ENCOUNTER — HOSPITAL ENCOUNTER (OUTPATIENT)
Dept: RADIOLOGY | Facility: HOSPITAL | Age: 38
Discharge: HOME | End: 2023-03-27
Attending: NURSE PRACTITIONER
Payer: COMMERCIAL

## 2023-03-27 VITALS
WEIGHT: 167 LBS | SYSTOLIC BLOOD PRESSURE: 124 MMHG | HEART RATE: 90 BPM | HEIGHT: 68 IN | RESPIRATION RATE: 16 BRPM | BODY MASS INDEX: 25.31 KG/M2 | DIASTOLIC BLOOD PRESSURE: 80 MMHG

## 2023-03-27 DIAGNOSIS — M47.22 CERVICAL SPONDYLOSIS WITH MYELOPATHY AND RADICULOPATHY: Primary | ICD-10-CM

## 2023-03-27 DIAGNOSIS — M47.22 CERVICAL SPONDYLOSIS WITH RADICULOPATHY: ICD-10-CM

## 2023-03-27 DIAGNOSIS — G56.23 ULNAR NEUROPATHY OF BOTH UPPER EXTREMITIES: ICD-10-CM

## 2023-03-27 DIAGNOSIS — G89.18 ACUTE POST-OPERATIVE PAIN: ICD-10-CM

## 2023-03-27 DIAGNOSIS — M47.12 CERVICAL SPONDYLOSIS WITH MYELOPATHY AND RADICULOPATHY: Primary | ICD-10-CM

## 2023-03-27 DIAGNOSIS — M50.10 DISPLACEMENT OF CERVICAL INTERVERTEBRAL DISC WITH RADICULOPATHY: ICD-10-CM

## 2023-03-27 PROCEDURE — 3079F DIAST BP 80-89 MM HG: CPT | Mod: CPTII,,, | Performed by: NEUROLOGICAL SURGERY

## 2023-03-27 PROCEDURE — 3079F PR MOST RECENT DIASTOLIC BLOOD PRESSURE 80-89 MM HG: ICD-10-PCS | Mod: CPTII,,, | Performed by: NEUROLOGICAL SURGERY

## 2023-03-27 PROCEDURE — 3074F SYST BP LT 130 MM HG: CPT | Mod: CPTII,,, | Performed by: NEUROLOGICAL SURGERY

## 2023-03-27 PROCEDURE — 3008F BODY MASS INDEX DOCD: CPT | Mod: CPTII,,, | Performed by: NEUROLOGICAL SURGERY

## 2023-03-27 PROCEDURE — 99213 PR OFFICE/OUTPT VISIT, EST, LEVL III, 20-29 MIN: ICD-10-PCS | Mod: ,,, | Performed by: NEUROLOGICAL SURGERY

## 2023-03-27 PROCEDURE — 99213 OFFICE O/P EST LOW 20 MIN: CPT | Mod: ,,, | Performed by: NEUROLOGICAL SURGERY

## 2023-03-27 PROCEDURE — 1159F MED LIST DOCD IN RCRD: CPT | Mod: CPTII,,, | Performed by: NEUROLOGICAL SURGERY

## 2023-03-27 PROCEDURE — 1159F PR MEDICATION LIST DOCUMENTED IN MEDICAL RECORD: ICD-10-PCS | Mod: CPTII,,, | Performed by: NEUROLOGICAL SURGERY

## 2023-03-27 PROCEDURE — 72052 X-RAY EXAM NECK SPINE 6/>VWS: CPT | Mod: TC

## 2023-03-27 PROCEDURE — 1160F PR REVIEW ALL MEDS BY PRESCRIBER/CLIN PHARMACIST DOCUMENTED: ICD-10-PCS | Mod: CPTII,,, | Performed by: NEUROLOGICAL SURGERY

## 2023-03-27 PROCEDURE — 3074F PR MOST RECENT SYSTOLIC BLOOD PRESSURE < 130 MM HG: ICD-10-PCS | Mod: CPTII,,, | Performed by: NEUROLOGICAL SURGERY

## 2023-03-27 PROCEDURE — 3008F PR BODY MASS INDEX (BMI) DOCUMENTED: ICD-10-PCS | Mod: CPTII,,, | Performed by: NEUROLOGICAL SURGERY

## 2023-03-27 PROCEDURE — 1160F RVW MEDS BY RX/DR IN RCRD: CPT | Mod: CPTII,,, | Performed by: NEUROLOGICAL SURGERY

## 2023-03-27 RX ORDER — DEXTROAMPHETAMINE SACCHARATE, AMPHETAMINE ASPARTATE, DEXTROAMPHETAMINE SULFATE, AND AMPHETAMINE SULFATE 3.75; 3.75; 3.75; 3.75 MG/1; MG/1; MG/1; MG/1
15 TABLET ORAL
COMMUNITY
Start: 2023-03-23

## 2023-03-27 RX ORDER — HYDROCODONE BITARTRATE AND ACETAMINOPHEN 7.5; 325 MG/1; MG/1
1 TABLET ORAL EVERY 6 HOURS PRN
Qty: 40 TABLET | Refills: 0 | Status: SHIPPED | OUTPATIENT
Start: 2023-03-27 | End: 2023-12-20

## 2023-03-27 RX ORDER — METHOCARBAMOL 500 MG/1
500-1000 TABLET, FILM COATED ORAL 3 TIMES DAILY PRN
Qty: 30 TABLET | Refills: 11 | Status: SHIPPED | OUTPATIENT
Start: 2023-03-27 | End: 2023-12-20 | Stop reason: SDUPTHER

## 2023-03-27 NOTE — PROGRESS NOTES
[facility]  Neurosurgery        Farzad Lockwood   [id number]   1985       SUBJECTIVE:     CHIEF COMPLAINT:    3 months post-op    HPI:    Farzad Lockwood is a 37 y.o.-year-old male who presents today for post-operative follow-up.  He is s/p C5-6, C6-7 TDR that was done on 12/20/22.  He was doing well at 2 weeks post op with much improvement in preoperative symptoms.  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  He describes pain and tightness in bilateral trapezius muscles.  He has pain and numbness in the 4th and 5th digits of the right greater than left hands.  The numbness he experienced in the 1st through 3rd digits prior to surgery has resolved.  He reports a couple of times that he noticed some pain in the triceps area when laying down at night, but the symptoms are mainly limited to the hands.  He denies waking at night with numbness in the hands.  He is unable to write on the [employer] board at work, as this causes an increase in symptoms.  He resumed playing the piano recently and feels like this aggravates the symptoms in the hands as well.  His symptoms are most severe towards the end of the day, especially when he has been active.  He says he typically feels good on days when he does not teach.  He has been going to physical therapy for about 30 days now.  He says they are mainly working on mobility.  He resumed his gabapentin, but does not take it on days he teaches due to side effects.  He takes Norco in the evenings when his pain is severe, but only has 1 or 2 left.  He is out of his muscle relaxers.        Review of patient's allergies indicates:   Allergen Reactions    Penicillins Hives    Tramadol Hives     Other reaction(s): NIGHTMARES     Current Outpatient Medications   Medication Sig Dispense Refill    ADDERALL 15 mg tablet Take 15 mg by mouth as needed.      allopurinoL (ZYLOPRIM) 100 MG tablet Take 3 tablets (300 mg total) by mouth  once daily. 90 tablet 2    ARMOUR THYROID 120 mg Tab Take 1 tablet by mouth once daily.      ARMOUR THYROID 15 mg Tab TAKE 1 TABLET (15 MG TOTAL) BY MOUTH ONCE DAILY AT 6AM. 90 tablet 1    calcium carbonate/vitamin D3 (CALCIUM+D ORAL) Take 0.5 tablets by mouth 2 (two) times a day. Calcium 1000 mg Vit D 40mcg      cetirizine (ZYRTEC) 10 MG tablet as needed.      clonazePAM (KLONOPIN) 0.5 MG tablet Take 0.5 mg by mouth 2 (two) times daily as needed.      dextroamphetamine-amphetamine (ADDERALL XR) 20 MG 24 hr capsule Take 10-20 mg by mouth every morning.      HYDROcodone-acetaminophen (NORCO) 7.5-325 mg per tablet Take 1-2 tablets by mouth every 4 (four) hours as needed for Pain. 60 tablet 0    lamoTRIgine (LAMICTAL) 200 MG tablet TAKE 1 TABLET BY MOUTH EVERY DAY 90 tablet 2    MAGNESIUM CHLORIDE ORAL Take 1 tablet by mouth 2 (two) times a day. Magnesium 80mg      methocarbamoL (ROBAXIN) 500 MG Tab Take 2 tablets (1,000 mg total) by mouth every 8 (eight) hours as needed (prn muscle spasms). 30 tablet 2    omeprazole (PRILOSEC) 40 MG capsule TAKE 1 CAPSULE BY MOUTH EVERY DAY 90 capsule 1    pregabalin (LYRICA) 75 MG capsule Take 1 capsule (75 mg total) by mouth 3 (three) times daily. 90 capsule 5    QUEtiapine (SEROQUEL) 100 MG Tab Take 100 mg by mouth nightly as needed.      tadalafiL (CIALIS) 10 MG tablet Take 1-2 tablets by mouth daily as needed.      tamsulosin (FLOMAX) 0.4 mg Cap TAKE 1 CAPSULE BY MOUTH ONCE DAILY 90 capsule 1     No current facility-administered medications for this visit.     Past Medical History:   Diagnosis Date    ADHD (attention deficit hyperactivity disorder)     Anxiety disorder, unspecified     Bipolar disorder, unspecified     Brachial neuritis     Cervical spinal stenosis     Cervical spondylosis with radiculopathy     GERD (gastroesophageal reflux disease)     Gout, unspecified     Head pain     Hypercholesteremia     Hypothyroidism, unspecified     Insomnia     Neck pain      "Neuropathy     Osteopenia     PONV (postoperative nausea and vomiting)     c dizziness     Past Surgical History:   Procedure Laterality Date    APPENDECTOMY  2007    CHOLECYSTECTOMY      COLONOSCOPY      ESOPHAGOGASTRODUODENOSCOPY      LASIK Bilateral     Radioactive iodine-induced hypothyroidism      RHINOPLASTY      TOTAL REPLACEMENT OF CERVICAL INTERVERTEBRAL DISC N/A 12/20/2022    Procedure: REPLACEMENT, INTERVERTEBRAL DISC, CERVICAL, TOTAL;  Surgeon: Austin Denise MD;  Location: [facility];  Service: Neurosurgery;  Laterality: N/A;  C5-6, C6-7 total disc replacement, ACDF if necessary  NTI     Family History       Problem Relation (Age of Onset)    Cancer Maternal Grandfather    Hypothyroidism Paternal Grandmother    Mitral valve prolapse Mother, Brother    PONV Mother          Social History     Socioeconomic History    Marital status: Significant Other   Tobacco Use    Smoking status: Never    Smokeless tobacco: Never   Substance and Sexual Activity    Alcohol use: Never    Drug use: Never       Review of systems:  Pertinent items are noted in HPI      OBJECTIVE:     Vital Signs  Pulse: 90  Resp: 16  BP: 124/80  Pain Score:   5  Height: 5' 8" (172.7 cm)  Weight: 75.8 kg (167 lb)  Body mass index is 25.39 kg/m².      Physical Exam:    General:  Pleasant. Well-nourished. Alert. No acute distress.    Head:  Normocephalic, without obvious abnormality, atraumatic    Lungs:   Breathing is quiet, non-lablored    Neurological:    Oriented to Person, Place, Time   Speech:  normal  Memory, cognition, and affect are appropriate.  Motor Strength: Moves all extremities spontaneously with good tone.  No abnormal movements seen.  normal 5/5 strength in all tested muscle groups  Tinel's is positive at right elbow  Madsen's is negative bilaterally    Cervical incision:  Well healed    Gait:  normal    His three-month postop x-rays look awesome.  He has excellent range of motion.  I am not surprised he is still having some " posterior cervical complaints and I reassured him about that.  I think his upper extremity complaints likely peripheral nerve-related.    ASSESSMENT/PLAN:     1. Cervical spondylosis with myelopathy and radiculopathy  - Ambulatory referral/consult to Neurology; EMG BUE  - X-ray cervical 5 views with flex/ext; at 1 yr post op    2. Ulnar neuropathy of both upper extremities  - Ambulatory referral/consult to Physical/Occupational Therapy; hand therapy  - Ambulatory referral/consult to Neurology; EMG BUE  - Add ibuprofen OTC; up to 800mg four times a day as tolerated.  - Sent in refills of Norco/Robaxin  - Resume gabapentin    - Follow up 6 months post op w/ EMGs; Follow up 1 year post op w/ x-rays        I, Dr. Austin Denise, personally performed the services described in this documentation. All medical record entries made by the scribe, Fern Nguyễn RN, were at my direction and in my presence.  I have reviewed the chart and agree that the record reflects my personal performance and is accurate and complete. Austin Denise MD.  11:28 AM 03/27/2023           Austin Denise MD FACS [facility]

## 2023-04-05 ENCOUNTER — TELEPHONE (OUTPATIENT)
Dept: NEUROSURGERY | Facility: CLINIC | Age: 38
End: 2023-04-05
Payer: COMMERCIAL

## 2023-04-17 ENCOUNTER — TELEPHONE (OUTPATIENT)
Dept: NEUROLOGY | Facility: CLINIC | Age: 38
End: 2023-04-17
Payer: COMMERCIAL

## 2023-04-17 NOTE — TELEPHONE ENCOUNTER
Patient has EMG scheduled for 04/27/2023.   He is wanting to make sure he is able to take his Lyrica medication on day of procedure.     Call back: [phone]

## 2023-04-27 ENCOUNTER — PROCEDURE VISIT (OUTPATIENT)
Dept: NEUROLOGY | Facility: CLINIC | Age: 38
End: 2023-04-27
Payer: COMMERCIAL

## 2023-04-27 DIAGNOSIS — M47.22 CERVICAL SPONDYLOSIS WITH MYELOPATHY AND RADICULOPATHY: ICD-10-CM

## 2023-04-27 DIAGNOSIS — M47.12 CERVICAL SPONDYLOSIS WITH MYELOPATHY AND RADICULOPATHY: ICD-10-CM

## 2023-04-27 DIAGNOSIS — G56.23 ULNAR NEUROPATHY OF BOTH UPPER EXTREMITIES: ICD-10-CM

## 2023-04-27 PROCEDURE — 95910 PR NERVE CONDUCTION STUDY; 7-8 STUDIES: ICD-10-PCS | Mod: S$GLB,,, | Performed by: SPECIALIST

## 2023-04-27 PROCEDURE — 95910 NRV CNDJ TEST 7-8 STUDIES: CPT | Mod: S$GLB,,, | Performed by: SPECIALIST

## 2023-04-27 PROCEDURE — 95886 MUSC TEST DONE W/N TEST COMP: CPT | Mod: S$GLB,,, | Performed by: SPECIALIST

## 2023-04-27 PROCEDURE — 95886 PR EMG COMPLETE, W/ NERVE CONDUCTION STUDIES, 5+ MUSCLES: ICD-10-PCS | Mod: S$GLB,,, | Performed by: SPECIALIST

## 2023-04-27 NOTE — PROCEDURES
Procedures  Nerve conductions / EMG performed and report scanned in chart. (Media tab)   Normal BUE studies     Silvestre Means MD

## 2023-12-20 ENCOUNTER — HOSPITAL ENCOUNTER (OUTPATIENT)
Dept: RADIOLOGY | Facility: HOSPITAL | Age: 38
Discharge: HOME | End: 2023-12-20
Attending: NEUROLOGICAL SURGERY
Payer: COMMERCIAL

## 2023-12-20 ENCOUNTER — OFFICE VISIT (OUTPATIENT)
Dept: NEUROSURGERY | Facility: CLINIC | Age: 38
End: 2023-12-20
Payer: COMMERCIAL

## 2023-12-20 VITALS
HEART RATE: 64 BPM | DIASTOLIC BLOOD PRESSURE: 81 MMHG | SYSTOLIC BLOOD PRESSURE: 127 MMHG | HEIGHT: 69 IN | RESPIRATION RATE: 20 BRPM | BODY MASS INDEX: 26.07 KG/M2 | WEIGHT: 176 LBS

## 2023-12-20 VITALS
HEIGHT: 69 IN | RESPIRATION RATE: 20 BRPM | SYSTOLIC BLOOD PRESSURE: 127 MMHG | DIASTOLIC BLOOD PRESSURE: 81 MMHG | WEIGHT: 176 LBS | BODY MASS INDEX: 26.07 KG/M2 | HEART RATE: 64 BPM

## 2023-12-20 DIAGNOSIS — M47.22 CERVICAL SPONDYLOSIS WITH MYELOPATHY AND RADICULOPATHY: Primary | ICD-10-CM

## 2023-12-20 DIAGNOSIS — M62.838 TRAPEZIUS MUSCLE SPASM: Primary | ICD-10-CM

## 2023-12-20 DIAGNOSIS — M47.12 CERVICAL SPONDYLOSIS WITH MYELOPATHY AND RADICULOPATHY: ICD-10-CM

## 2023-12-20 DIAGNOSIS — M47.22 CERVICAL SPONDYLOSIS WITH MYELOPATHY AND RADICULOPATHY: ICD-10-CM

## 2023-12-20 DIAGNOSIS — M47.12 CERVICAL SPONDYLOSIS WITH MYELOPATHY AND RADICULOPATHY: Primary | ICD-10-CM

## 2023-12-20 PROCEDURE — 99024 PR POST-OP FOLLOW-UP VISIT: ICD-10-PCS | Mod: ,,, | Performed by: NURSE PRACTITIONER

## 2023-12-20 PROCEDURE — 1159F MED LIST DOCD IN RCRD: CPT | Mod: CPTII,,, | Performed by: NURSE PRACTITIONER

## 2023-12-20 PROCEDURE — 99213 OFFICE O/P EST LOW 20 MIN: CPT | Mod: ,,, | Performed by: NEUROLOGICAL SURGERY

## 2023-12-20 PROCEDURE — 3079F PR MOST RECENT DIASTOLIC BLOOD PRESSURE 80-89 MM HG: ICD-10-PCS | Mod: CPTII,,, | Performed by: NURSE PRACTITIONER

## 2023-12-20 PROCEDURE — 1159F PR MEDICATION LIST DOCUMENTED IN MEDICAL RECORD: ICD-10-PCS | Mod: CPTII,,, | Performed by: NURSE PRACTITIONER

## 2023-12-20 PROCEDURE — 3079F DIAST BP 80-89 MM HG: CPT | Mod: CPTII,,, | Performed by: NURSE PRACTITIONER

## 2023-12-20 PROCEDURE — 20553 TRIGGER POINT INJECTION: ICD-10-PCS | Mod: ,,, | Performed by: NURSE PRACTITIONER

## 2023-12-20 PROCEDURE — 3074F SYST BP LT 130 MM HG: CPT | Mod: CPTII,,, | Performed by: NURSE PRACTITIONER

## 2023-12-20 PROCEDURE — 3008F BODY MASS INDEX DOCD: CPT | Mod: CPTII,,, | Performed by: NEUROLOGICAL SURGERY

## 2023-12-20 PROCEDURE — 3074F SYST BP LT 130 MM HG: CPT | Mod: CPTII,,, | Performed by: NEUROLOGICAL SURGERY

## 2023-12-20 PROCEDURE — 20553 NJX 1/MLT TRIGGER POINTS 3/>: CPT | Mod: ,,, | Performed by: NURSE PRACTITIONER

## 2023-12-20 PROCEDURE — 1159F MED LIST DOCD IN RCRD: CPT | Mod: CPTII,,, | Performed by: NEUROLOGICAL SURGERY

## 2023-12-20 PROCEDURE — 99024 POSTOP FOLLOW-UP VISIT: CPT | Mod: ,,, | Performed by: NURSE PRACTITIONER

## 2023-12-20 PROCEDURE — 3079F DIAST BP 80-89 MM HG: CPT | Mod: CPTII,,, | Performed by: NEUROLOGICAL SURGERY

## 2023-12-20 PROCEDURE — 3074F PR MOST RECENT SYSTOLIC BLOOD PRESSURE < 130 MM HG: ICD-10-PCS | Mod: CPTII,,, | Performed by: NURSE PRACTITIONER

## 2023-12-20 PROCEDURE — 72052 X-RAY EXAM NECK SPINE 6/>VWS: CPT | Mod: TC

## 2023-12-20 PROCEDURE — 1160F PR REVIEW ALL MEDS BY PRESCRIBER/CLIN PHARMACIST DOCUMENTED: ICD-10-PCS | Mod: CPTII,,, | Performed by: NURSE PRACTITIONER

## 2023-12-20 PROCEDURE — 1160F RVW MEDS BY RX/DR IN RCRD: CPT | Mod: CPTII,,, | Performed by: NEUROLOGICAL SURGERY

## 2023-12-20 PROCEDURE — 1160F RVW MEDS BY RX/DR IN RCRD: CPT | Mod: CPTII,,, | Performed by: NURSE PRACTITIONER

## 2023-12-20 RX ORDER — MELOXICAM 15 MG/1
15 TABLET ORAL DAILY
COMMUNITY
Start: 2023-12-12 | End: 2024-01-22

## 2023-12-20 RX ORDER — METHOCARBAMOL 500 MG/1
500-1000 TABLET, FILM COATED ORAL 3 TIMES DAILY PRN
Qty: 60 TABLET | Refills: 11 | Status: SHIPPED | OUTPATIENT
Start: 2023-12-20 | End: 2024-01-22

## 2023-12-20 NOTE — PROGRESS NOTES
[facility]  Neurosurgery        Farzad Lockwood   [id number]   1985       SUBJECTIVE:     CHIEF COMPLAINT:    1 year post-op    HPI:    Farzad Lockwood is a 38 y.o.-year-old male who presents today for post-operative follow-up.  He is s/p C5-6, C6-7 TDR that was done on 12/20/22.  He continues with intermittent pain and spasms in the neck and right trapezius region therefore Dr. Denise has recommended some trigger point injections today.    Review of patient's allergies indicates:   Allergen Reactions    Tramadol Hives     Other reaction(s): NIGHTMARES     Current Outpatient Medications   Medication Sig Dispense Refill    ADDERALL 15 mg tablet Take 15 mg by mouth as needed.      allopurinoL (ZYLOPRIM) 100 MG tablet Take 3 tablets (300 mg total) by mouth once daily. 90 tablet 2    ARMOUR THYROID 120 mg Tab TAKE 1 TABLET BY MOUTH EVERY DAY 90 tablet 3    ARMOUR THYROID 15 mg Tab TAKE 1 TABLET (15 MG TOTAL) BY MOUTH ONCE DAILY AT 6AM. 90 tablet 1    calcium carbonate/vitamin D3 (CALCIUM+D ORAL) Take 0.5 tablets by mouth 2 (two) times a day. Calcium 1000 mg Vit D 40mcg      cetirizine (ZYRTEC) 10 MG tablet as needed.      clonazePAM (KLONOPIN) 0.5 MG tablet Take 0.5 mg by mouth 2 (two) times daily as needed.      dextroamphetamine-amphetamine (ADDERALL XR) 20 MG 24 hr capsule Take 10-20 mg by mouth as needed.      lamoTRIgine (LAMICTAL) 200 MG tablet TAKE 1 TABLET BY MOUTH EVERY DAY 90 tablet 0    MAGNESIUM CHLORIDE ORAL Take 1 tablet by mouth 2 (two) times a day. Magnesium 80mg      meloxicam (MOBIC) 15 MG tablet Take 15 mg by mouth once daily.      methocarbamoL (ROBAXIN) 500 MG Tab Take 1-2 tablets (500-1,000 mg total) by mouth 3 (three) times daily as needed (prn muscle spasms). 60 tablet 11    omeprazole (PRILOSEC) 40 MG capsule TAKE 1 CAPSULE BY MOUTH EVERY DAY 90 capsule 1    pregabalin (LYRICA) 75 MG capsule Take 1 capsule (75 mg total) by mouth 3 (three) times daily. 90 capsule 5     QUEtiapine (SEROQUEL) 100 MG Tab Take 100 mg by mouth nightly as needed.      tadalafiL (CIALIS) 10 MG tablet Take 1-2 tablets by mouth daily as needed.      tamsulosin (FLOMAX) 0.4 mg Cap TAKE 1 CAPSULE BY MOUTH ONCE DAILY 90 capsule 1    HYDROcodone-acetaminophen (NORCO) 7.5-325 mg per tablet Take 1 tablet by mouth every 6 (six) hours as needed for Pain. 40 tablet 0     No current facility-administered medications for this visit.     Past Medical History:   Diagnosis Date    ADHD (attention deficit hyperactivity disorder)     Anxiety disorder, unspecified     Bipolar disorder, unspecified     Brachial neuritis     Cervical spinal stenosis     Cervical spondylosis with radiculopathy     GERD (gastroesophageal reflux disease)     Gout, unspecified     Head pain     Hypercholesteremia     Hypothyroidism, unspecified     Insomnia     Neck pain     Neuropathy     Osteopenia     PONV (postoperative nausea and vomiting)     c dizziness     Past Surgical History:   Procedure Laterality Date    APPENDECTOMY  2007    CHOLECYSTECTOMY      COLONOSCOPY      ESOPHAGOGASTRODUODENOSCOPY      LASIK Bilateral     Radioactive iodine-induced hypothyroidism      RHINOPLASTY      TOTAL REPLACEMENT OF CERVICAL INTERVERTEBRAL DISC N/A 12/20/2022    Procedure: REPLACEMENT, INTERVERTEBRAL DISC, CERVICAL, TOTAL;  Surgeon: Austin Denise MD;  Location: [facility];  Service: Neurosurgery;  Laterality: N/A;  C5-6, C6-7 total disc replacement, ACDF if necessary  NTI     Family History       Problem Relation (Age of Onset)    Cancer Maternal Grandfather    Hypothyroidism Paternal Grandmother    Mitral valve prolapse Mother, Brother    PONV Mother          Social History     Socioeconomic History    Marital status: Single   Tobacco Use    Smoking status: Never    Smokeless tobacco: Never   Substance and Sexual Activity    Alcohol use: Never    Drug use: Never       Review of systems:  Pertinent items are noted in HPI      OBJECTIVE:     Vital  "Signs  Pulse: 64  Resp: 20  BP: 127/81  Pain Score:   5  Height: 5' 9" (175.3 cm)  Weight: 79.8 kg (176 lb)  Body mass index is 25.99 kg/m².      Physical Exam:    General:  Pleasant. Well-nourished. Alert. No acute distress.    Head:  Normocephalic, without obvious abnormality, atraumatic    Lungs:   Breathing is quiet, non-lablored    Neurological:    Oriented to Person, Place, Time   Speech:  normal  Memory, cognition, and affect are appropriate.  Motor Strength: Moves all extremities spontaneously with good tone.  No abnormal movements seen.  normal 5/5 strength in all tested muscle groups  Tinel's is positive at right elbow  Madsen's is negative bilaterally    Cervical incision:  Well healed    Gait:  normal        ASSESSMENT/PLAN:     1. Trapezius muscle spasm    1. Trapezius muscle spasm    Mr. Lockwood divided trigger point injections today which he did tolerate well.  He was encouraged to his water intake today as well as to utilize Tylenol for any type of pain he may have.  He was encouraged to begin massage therapy in several days as well as to continue to his home exercises next week.    "

## 2023-12-20 NOTE — PROGRESS NOTES
[facility]  Neurosurgery        Farzad Lockwood   [id number]   1985       SUBJECTIVE:     CHIEF COMPLAINT:    1 year post-op    HPI:    Farzad Lockwood is a 38 y.o.-year-old male who presents today for post-operative follow-up.  He is s/p C5-6, C6-7 TDR that was done on 12/20/22.  He continues with intermittent pain and spasms in the neck and right trapezius region.  He no longer has burning pain or shocking sensations down the spine like he had before surgery.  His preoperative arm pain and numbness have resolved.  He has just recently completed a course of physical therapy, which has helped.  He is able to do most of the things he likes, such as playing the piano.  However, at times this will cause his symptoms to flare.  He mentions that he may have Lupus.  He saw his dermatologist for a rash and labs were done.  He was noted to have a positive OSWALDO.  He was referred to a rheumatologist in [place] and is scheduled for an appointment early next year.       Review of patient's allergies indicates:   Allergen Reactions    Penicillins Hives    Tramadol Hives     Other reaction(s): NIGHTMARES     Current Outpatient Medications   Medication Sig Dispense Refill    ADDERALL 15 mg tablet Take 15 mg by mouth as needed.      ARMOUR THYROID 120 mg Tab TAKE 1 TABLET BY MOUTH EVERY DAY 90 tablet 3    ARMOUR THYROID 15 mg Tab TAKE 1 TABLET (15 MG TOTAL) BY MOUTH ONCE DAILY AT 6AM. 90 tablet 1    calcium carbonate/vitamin D3 (CALCIUM+D ORAL) Take 0.5 tablets by mouth 2 (two) times a day. Calcium 1000 mg Vit D 40mcg      cetirizine (ZYRTEC) 10 MG tablet as needed.      clonazePAM (KLONOPIN) 0.5 MG tablet Take 0.5 mg by mouth 2 (two) times daily as needed.      dextroamphetamine-amphetamine (ADDERALL XR) 20 MG 24 hr capsule Take 10-20 mg by mouth as needed.      lamoTRIgine (LAMICTAL) 200 MG tablet TAKE 1 TABLET BY MOUTH EVERY DAY 90 tablet 0    MAGNESIUM CHLORIDE ORAL Take 1 tablet by mouth 2 (two)  times a day. Magnesium 80mg      meloxicam (MOBIC) 15 MG tablet Take 15 mg by mouth once daily.      omeprazole (PRILOSEC) 40 MG capsule TAKE 1 CAPSULE BY MOUTH EVERY DAY 90 capsule 1    pregabalin (LYRICA) 75 MG capsule Take 1 capsule (75 mg total) by mouth 3 (three) times daily. 90 capsule 5    tadalafiL (CIALIS) 10 MG tablet Take 1-2 tablets by mouth daily as needed.      tamsulosin (FLOMAX) 0.4 mg Cap TAKE 1 CAPSULE BY MOUTH ONCE DAILY 90 capsule 1    allopurinoL (ZYLOPRIM) 100 MG tablet Take 3 tablets (300 mg total) by mouth once daily. (Patient not taking: Reported on 12/20/2023) 90 tablet 2    HYDROcodone-acetaminophen (NORCO) 7.5-325 mg per tablet Take 1 tablet by mouth every 6 (six) hours as needed for Pain. (Patient not taking: Reported on 12/20/2023) 40 tablet 0    methocarbamoL (ROBAXIN) 500 MG Tab Take 1-2 tablets (500-1,000 mg total) by mouth 3 (three) times daily as needed (prn muscle spasms). 60 tablet 11    QUEtiapine (SEROQUEL) 100 MG Tab Take 100 mg by mouth nightly as needed.       No current facility-administered medications for this visit.     Past Medical History:   Diagnosis Date    ADHD (attention deficit hyperactivity disorder)     Anxiety disorder, unspecified     Bipolar disorder, unspecified     Brachial neuritis     Cervical spinal stenosis     Cervical spondylosis with radiculopathy     GERD (gastroesophageal reflux disease)     Gout, unspecified     Head pain     Hypercholesteremia     Hypothyroidism, unspecified     Insomnia     Neck pain     Neuropathy     Osteopenia     PONV (postoperative nausea and vomiting)     c dizziness     Past Surgical History:   Procedure Laterality Date    APPENDECTOMY  2007    CHOLECYSTECTOMY      COLONOSCOPY      ESOPHAGOGASTRODUODENOSCOPY      LASIK Bilateral     Radioactive iodine-induced hypothyroidism      RHINOPLASTY      TOTAL REPLACEMENT OF CERVICAL INTERVERTEBRAL DISC N/A 12/20/2022    Procedure: REPLACEMENT, INTERVERTEBRAL DISC, CERVICAL,  "TOTAL;  Surgeon: Austin Denise MD;  Location: [facility];  Service: Neurosurgery;  Laterality: N/A;  C5-6, C6-7 total disc replacement, ACDF if necessary  NTI     Family History       Problem Relation (Age of Onset)    Cancer Maternal Grandfather    Hypothyroidism Paternal Grandmother    Mitral valve prolapse Mother, Brother    PONV Mother          Social History     Socioeconomic History    Marital status: Single   Tobacco Use    Smoking status: Never    Smokeless tobacco: Never   Substance and Sexual Activity    Alcohol use: Never    Drug use: Never       Review of systems:  Pertinent items are noted in HPI      OBJECTIVE:     Vital Signs  Pulse: 64  Resp: 20  BP: 127/81  Pain Score:   5  Height: 5' 9" (175.3 cm)  Weight: 79.8 kg (176 lb)  Body mass index is 25.99 kg/m².      Physical Exam:    General:  Pleasant. Well-nourished. Alert. No acute distress.    Head:  Normocephalic, without obvious abnormality, atraumatic    Lungs:   Breathing is quiet, non-lablored    Neurological:    Oriented to Person, Place, Time   Speech:  normal  Memory, cognition, and affect are appropriate.  Motor Strength: Moves all extremities spontaneously with good tone.  No abnormal movements seen.  normal 5/5 strength in all tested muscle groups  Tinel's is positive at right elbow  Madsen's is negative bilaterally    Cervical incision:  Well healed    Gait:  normal    1 year postop x-rays show normal alignment with excellent position of the disc devices.  He has excellent range of motion with flexion/extension.    ASSESSMENT/PLAN:     1. Cervical spondylosis with myelopathy and radiculopathy  - methocarbamoL (ROBAXIN) 500 MG Tab; Take 1-2 tablets (500-1,000 mg total) by mouth 3 (three) times daily as needed (prn muscle spasms).  Dispense: 60 tablet; Refill: 11  - Recommend Pilates at [facility]  - Trigger point injections with Tricia NP  - Recommend massage after TPI  - Follow up as needed        I, Dr. Austin Denise, " personally performed the services described in this documentation. All medical record entries made by the scribe, Fern Nguyễn RN, were at my direction and in my presence.  I have reviewed the chart and agree that the record reflects my personal performance and is accurate and complete. Austin Denise MD.  11:28 AM 12/20/2023           Austin Denise MD FACS [facility]

## 2023-12-20 NOTE — PROCEDURES
Trigger Point Injection    Performed by: Tricia Dukes FNP  Authorized by: Tricia Dukes FNP    Cervical Paraspinal:  Right  Trapezus:  Bilateral    Consent Done?:  Yes (Verbal)    Pre-Procedure:   Indications:  Pain    Local anesthesia used?: Yes    Local anesthetic:  Lidocaine 2% with epinephrine  Anesthetic total (ml):  2

## 2024-01-02 ENCOUNTER — TELEPHONE (OUTPATIENT)
Dept: NEUROSURGERY | Facility: CLINIC | Age: 39
End: 2024-01-02
Payer: COMMERCIAL

## 2024-01-02 DIAGNOSIS — M47.12 CERVICAL SPONDYLOSIS WITH MYELOPATHY AND RADICULOPATHY: Primary | ICD-10-CM

## 2024-01-02 DIAGNOSIS — M54.16 LUMBAR RADICULOPATHY: ICD-10-CM

## 2024-01-02 DIAGNOSIS — M47.22 CERVICAL SPONDYLOSIS WITH MYELOPATHY AND RADICULOPATHY: Primary | ICD-10-CM

## 2024-01-02 NOTE — TELEPHONE ENCOUNTER
"The pt called today requesting an appt. He states that [place] not go to the ED. He is now c/o neck pain, pain down his R leg and pain in his R fingers. He rates this pain as a 9/10 and describes it as "shooting,burning and tingling" He  has been seeing the chiropractor Moshe Tamez 2x/week and has been taking muscle relaxer and Lyrica w/ no relief. Please advise  "

## 2024-01-22 ENCOUNTER — HOSPITAL ENCOUNTER (OUTPATIENT)
Dept: RADIOLOGY | Facility: HOSPITAL | Age: 39
Discharge: HOME | End: 2024-01-22
Attending: PHYSICIAN ASSISTANT
Payer: COMMERCIAL

## 2024-01-22 ENCOUNTER — OFFICE VISIT (OUTPATIENT)
Dept: NEUROSURGERY | Facility: CLINIC | Age: 39
End: 2024-01-22
Payer: COMMERCIAL

## 2024-01-22 VITALS
RESPIRATION RATE: 16 BRPM | HEART RATE: 92 BPM | BODY MASS INDEX: 24.73 KG/M2 | HEIGHT: 69 IN | WEIGHT: 167 LBS | DIASTOLIC BLOOD PRESSURE: 76 MMHG | SYSTOLIC BLOOD PRESSURE: 135 MMHG

## 2024-01-22 DIAGNOSIS — M54.16 LUMBAR RADICULOPATHY: ICD-10-CM

## 2024-01-22 DIAGNOSIS — M62.838 MUSCLE SPASTICITY: ICD-10-CM

## 2024-01-22 DIAGNOSIS — M47.12 CERVICAL SPONDYLOSIS WITH MYELOPATHY AND RADICULOPATHY: ICD-10-CM

## 2024-01-22 DIAGNOSIS — M47.12 CERVICAL SPONDYLOSIS WITH MYELOPATHY AND RADICULOPATHY: Primary | ICD-10-CM

## 2024-01-22 DIAGNOSIS — M47.22 CERVICAL SPONDYLOSIS WITH MYELOPATHY AND RADICULOPATHY: Primary | ICD-10-CM

## 2024-01-22 DIAGNOSIS — M47.22 CERVICAL SPONDYLOSIS WITH MYELOPATHY AND RADICULOPATHY: ICD-10-CM

## 2024-01-22 PROCEDURE — 20553 NJX 1/MLT TRIGGER POINTS 3/>: CPT | Mod: ,,, | Performed by: NURSE PRACTITIONER

## 2024-01-22 PROCEDURE — 3008F BODY MASS INDEX DOCD: CPT | Mod: CPTII,,, | Performed by: PHYSICIAN ASSISTANT

## 2024-01-22 PROCEDURE — 99215 OFFICE O/P EST HI 40 MIN: CPT | Mod: ,,, | Performed by: PHYSICIAN ASSISTANT

## 2024-01-22 PROCEDURE — 1160F RVW MEDS BY RX/DR IN RCRD: CPT | Mod: CPTII,,, | Performed by: PHYSICIAN ASSISTANT

## 2024-01-22 PROCEDURE — 72052 X-RAY EXAM NECK SPINE 6/>VWS: CPT | Mod: TC

## 2024-01-22 PROCEDURE — 3078F DIAST BP <80 MM HG: CPT | Mod: CPTII,,, | Performed by: PHYSICIAN ASSISTANT

## 2024-01-22 PROCEDURE — 3075F SYST BP GE 130 - 139MM HG: CPT | Mod: CPTII,,, | Performed by: PHYSICIAN ASSISTANT

## 2024-01-22 PROCEDURE — 72114 X-RAY EXAM L-S SPINE BENDING: CPT | Mod: TC

## 2024-01-22 PROCEDURE — 1159F MED LIST DOCD IN RCRD: CPT | Mod: CPTII,,, | Performed by: PHYSICIAN ASSISTANT

## 2024-01-22 RX ORDER — THYROID, PORCINE 180 MG/1
165 TABLET ORAL DAILY
COMMUNITY
Start: 2023-04-01 | End: 2024-03-18

## 2024-01-22 RX ORDER — DICLOFENAC SODIUM 50 MG/1
50 TABLET, DELAYED RELEASE ORAL
COMMUNITY
Start: 2024-01-02 | End: 2024-06-30

## 2024-01-22 RX ORDER — PREGABALIN 50 MG/1
50 CAPSULE ORAL 4 TIMES DAILY
COMMUNITY
Start: 2023-12-15 | End: 2024-03-18

## 2024-01-22 RX ORDER — TADALAFIL 20 MG/1
20 TABLET ORAL DAILY PRN
COMMUNITY
Start: 2023-12-22

## 2024-01-22 RX ORDER — CLOMIPHENE CITRATE 50 MG/1
50 TABLET ORAL DAILY
COMMUNITY
Start: 2024-01-02 | End: 2024-01-22

## 2024-01-22 RX ORDER — HYDROXYCHLOROQUINE SULFATE 200 MG/1
200 TABLET, FILM COATED ORAL DAILY
COMMUNITY
Start: 2023-11-10

## 2024-01-22 RX ORDER — DULOXETIN HYDROCHLORIDE 30 MG/1
30 CAPSULE, DELAYED RELEASE ORAL DAILY
COMMUNITY
Start: 2023-12-21 | End: 2024-03-25

## 2024-01-22 RX ORDER — METHOCARBAMOL 750 MG/1
1 TABLET, FILM COATED ORAL NIGHTLY PRN
COMMUNITY
Start: 2024-01-02 | End: 2024-03-18
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
## 2024-01-25 NOTE — PROCEDURES
Trigger Point Injection    Performed by: Tricia Dukes FNP  Authorized by: Tricia Dukes FNP    Rhomboid:  Right  Trapezus:  Bilateral    Consent Done?:  Yes (Verbal)    Pre-Procedure:   Indications:  Pain    Local anesthesia used?: Yes    Anesthesia:  Local infiltration  Local anesthetic:  Lidocaine 2% with epinephrine

## 2024-02-26 ENCOUNTER — TELEPHONE (OUTPATIENT)
Dept: NEUROSURGERY | Facility: CLINIC | Age: 39
End: 2024-02-26
Payer: COMMERCIAL

## 2024-02-26 DIAGNOSIS — M54.9 DORSALGIA, UNSPECIFIED: Primary | ICD-10-CM

## 2024-02-26 DIAGNOSIS — M47.12 CERVICAL SPONDYLOSIS WITH MYELOPATHY AND RADICULOPATHY: ICD-10-CM

## 2024-02-26 DIAGNOSIS — M47.22 CERVICAL SPONDYLOSIS WITH MYELOPATHY AND RADICULOPATHY: ICD-10-CM

## 2024-02-26 NOTE — TELEPHONE ENCOUNTER
Patient would like to go to [email or web address]; I told him I would work on the auth and then send to [place] to schedule if he would let me know when that is due to his work schedule.

## 2024-03-18 ENCOUNTER — OFFICE VISIT (OUTPATIENT)
Dept: NEUROSURGERY | Facility: CLINIC | Age: 39
End: 2024-03-18
Payer: COMMERCIAL

## 2024-03-18 VITALS
BODY MASS INDEX: 23.11 KG/M2 | WEIGHT: 156 LBS | DIASTOLIC BLOOD PRESSURE: 76 MMHG | HEART RATE: 85 BPM | RESPIRATION RATE: 16 BRPM | SYSTOLIC BLOOD PRESSURE: 128 MMHG | HEIGHT: 69 IN

## 2024-03-18 DIAGNOSIS — M47.22 CERVICAL SPONDYLOSIS WITH MYELOPATHY AND RADICULOPATHY: Primary | ICD-10-CM

## 2024-03-18 DIAGNOSIS — M47.22 CERVICAL SPONDYLOSIS WITH RADICULOPATHY: ICD-10-CM

## 2024-03-18 DIAGNOSIS — M54.16 LUMBAR RADICULOPATHY: ICD-10-CM

## 2024-03-18 DIAGNOSIS — M46.1 SI (SACROILIAC) JOINT INFLAMMATION: ICD-10-CM

## 2024-03-18 DIAGNOSIS — M47.12 CERVICAL SPONDYLOSIS WITH MYELOPATHY AND RADICULOPATHY: Primary | ICD-10-CM

## 2024-03-18 PROCEDURE — 3074F SYST BP LT 130 MM HG: CPT | Mod: CPTII,,, | Performed by: PHYSICIAN ASSISTANT

## 2024-03-18 PROCEDURE — 3078F DIAST BP <80 MM HG: CPT | Mod: CPTII,,, | Performed by: PHYSICIAN ASSISTANT

## 2024-03-18 PROCEDURE — 1160F RVW MEDS BY RX/DR IN RCRD: CPT | Mod: CPTII,,, | Performed by: PHYSICIAN ASSISTANT

## 2024-03-18 PROCEDURE — 1159F MED LIST DOCD IN RCRD: CPT | Mod: CPTII,,, | Performed by: PHYSICIAN ASSISTANT

## 2024-03-18 PROCEDURE — 99214 OFFICE O/P EST MOD 30 MIN: CPT | Mod: ,,, | Performed by: PHYSICIAN ASSISTANT

## 2024-03-18 PROCEDURE — 3008F BODY MASS INDEX DOCD: CPT | Mod: CPTII,,, | Performed by: PHYSICIAN ASSISTANT

## 2024-03-18 RX ORDER — PREGABALIN 75 MG/1
75 CAPSULE ORAL 4 TIMES DAILY PRN
COMMUNITY
Start: 2024-02-19

## 2024-03-18 RX ORDER — METHOCARBAMOL 500 MG/1
500-1000 TABLET, FILM COATED ORAL 3 TIMES DAILY PRN
COMMUNITY
Start: 2024-03-09

## 2024-03-18 RX ORDER — THYROID, PORCINE 120 MG/1
180 TABLET ORAL DAILY
COMMUNITY

## 2024-03-18 RX ORDER — LIDOCAINE 50 MG/G
1 PATCH TOPICAL DAILY
Qty: 10 PATCH | Refills: 1 | Status: SHIPPED | OUTPATIENT
Start: 2024-03-18
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
## 2024-09-28 ENCOUNTER — HOSPITAL ENCOUNTER (EMERGENCY)
Facility: HOSPITAL | Age: 39
Discharge: HOME | End: 2024-09-28
Attending: EMERGENCY MEDICINE
Payer: COMMERCIAL

## 2024-09-28 VITALS
OXYGEN SATURATION: 97 % | SYSTOLIC BLOOD PRESSURE: 101 MMHG | WEIGHT: 150 LBS | HEART RATE: 74 BPM | DIASTOLIC BLOOD PRESSURE: 77 MMHG | RESPIRATION RATE: 18 BRPM | BODY MASS INDEX: 22.22 KG/M2 | HEIGHT: 69 IN | TEMPERATURE: 97 F

## 2024-09-28 DIAGNOSIS — T18.9XXA SWALLOWED FOREIGN BODY, INITIAL ENCOUNTER: Primary | ICD-10-CM

## 2024-09-28 PROCEDURE — 99282 EMERGENCY DEPT VISIT SF MDM: CPT

## 2024-09-28 NOTE — DISCHARGE INSTRUCTIONS
Drink lots of fluids return emergency room any discomfort foreign body sensation or abdominal    It was a pleasure to see you today. Please return if you have any issues. You may receive a survey in the mail, please take the opportunity to fill it out.

## 2024-09-28 NOTE — ED PROVIDER NOTES
"Encounter Date: 9/28/2024    SCRIBE #1 NOTE: I, Sunitha Walton, am scribing for, and in the presence of,  Danie Trujillo III, MD. I have scribed the following portions of the note - Other sections scribed: HPI, ROS, PE.       History     Chief Complaint   Patient presents with    Swallowed Foreign Body     Pt. States that he swallowed a "plastic keto strip," at 20:00 last night. Pt. Able to tolerate PO liquids, pt. Denies SOB     Patient is a 39-year-old male with a history of anxiety, bipolar disorder, GERD, and hypothyroidism presenting to the ED after ingesting a foreign body. The pt states that he accidentally swallowed a plastic keto strip that was in his pill bottle around 2000. He notes that he is able to tolerate liquid PO intake but has not attempted eating solids.     The history is provided by the patient. No  was used.     Review of patient's allergies indicates:   Allergen Reactions    Tramadol Hives     Other reaction(s): NIGHTMARES     Past Medical History:   Diagnosis Date    ADHD (attention deficit hyperactivity disorder)     Anxiety disorder, unspecified     Bipolar disorder, unspecified     Brachial neuritis     Cervical spinal stenosis     Cervical spondylosis with radiculopathy     GERD (gastroesophageal reflux disease)     Gout, unspecified     Head pain     Hypercholesteremia     Hypothyroidism, unspecified     Insomnia     Neck pain     Neuropathy     Osteopenia     PONV (postoperative nausea and vomiting)     c dizziness     Past Surgical History:   Procedure Laterality Date    APPENDECTOMY  2007    CHOLECYSTECTOMY      COLONOSCOPY      ESOPHAGOGASTRODUODENOSCOPY      LASIK Bilateral     Radioactive iodine-induced hypothyroidism      RHINOPLASTY      TOTAL REPLACEMENT OF CERVICAL INTERVERTEBRAL DISC N/A 12/20/2022    C5-6, C6-7 TDR.  Dr. Denise     Family History   Problem Relation Name Age of Onset    Mitral valve prolapse Mother      PONV Mother          c dizziness    " Mitral valve prolapse Brother      Cancer Maternal Grandfather      Hypothyroidism Paternal Grandmother       Social History     Tobacco Use    Smoking status: Never    Smokeless tobacco: Never   Substance Use Topics    Alcohol use: Never    Drug use: Never     Review of Systems   Constitutional:  Negative for chills, fatigue and fever.   HENT:  Negative for congestion and sore throat.    Eyes:  Negative for visual disturbance.   Respiratory:  Negative for cough and shortness of breath.    Cardiovascular:  Negative for chest pain.   Gastrointestinal:  Negative for abdominal pain, diarrhea, nausea and vomiting.   Genitourinary:  Negative for dysuria.   Musculoskeletal:  Negative for myalgias.   Skin:  Negative for rash.   Neurological:  Negative for weakness, numbness and headaches.   All other systems reviewed and are negative.      Physical Exam     Initial Vitals [09/28/24 0335]   BP Pulse Resp Temp SpO2   119/60 85 18 97.4 °F (36.3 °C) 96 %      MAP       --         Physical Exam    Constitutional: He appears well-developed and well-nourished. No distress.   HENT:   Head: Normocephalic and atraumatic.   Eyes: Conjunctivae and EOM are normal. Pupils are equal, round, and reactive to light. Right eye exhibits no discharge. Left eye exhibits no discharge. No scleral icterus.   Neck: No tracheal deviation present.   Cardiovascular:  Normal rate, regular rhythm, normal heart sounds and intact distal pulses.           No murmur heard.  Pulmonary/Chest: Breath sounds normal. No stridor. No respiratory distress. He has no wheezes. He has no rales.   Abdominal: Abdomen is soft. He exhibits no distension. There is no abdominal tenderness. There is no rebound and no guarding.   Musculoskeletal:         General: No tenderness or edema. Normal range of motion.     Neurological: He is alert and oriented to person, place, and time. He has normal strength and normal reflexes. No cranial nerve deficit.   Skin: Skin is warm and  dry. No rash noted. No erythema. No pallor.   Psychiatric: He has a normal mood and affect. His behavior is normal. Judgment and thought content normal.         ED Course   Procedures  Labs Reviewed - No data to display       Imaging Results    None          Medications - No data to display  Medical Decision Making  Differential diagnosis include but are not limited to: foreign body and esophageal obstruction.    Patient brought a sample of what he swallowed it was a 3 4 mm x 2 in piece of paper laminated no need for urgent intervention not an obstruction risk or perforation risk will discharge            Scribe Attestation:   Scribe #1: I performed the above scribed service and the documentation accurately describes the services I performed. I attest to the accuracy of the note.    Attending Attestation:           Physician Attestation for Scribe:  Physician Attestation Statement for Scribe #1: I, Danie Trujillo III, MD, reviewed documentation, as scribed by Sunitha Walton in my presence, and it is both accurate and complete.                                    Clinical Impression:  Final diagnoses:  [T18.9XXA] Swallowed foreign body, initial encounter (Primary)          ED Disposition Condition    Discharge Stable          ED Prescriptions    None       Follow-up Information       Follow up With Specialties Details Why Contact Info    Juan M Conti MD Internal Medicine In 1 week  [address] [facility]  [address]  [place] [address]  [phone]               Danie Trujillo III, MD  09/28/24 3239

## 2025-06-19 ENCOUNTER — TELEMEDICINE (OUTPATIENT)
Dept: NEUROLOGY | Facility: HOSPITAL | Age: 40
End: 2025-06-19
Payer: COMMERCIAL

## 2025-06-19 DIAGNOSIS — R41.89 COGNITIVE CHANGES: Primary | ICD-10-CM

## 2025-06-19 PROCEDURE — 99284 EMERGENCY DEPT VISIT MOD MDM: CPT | Mod: GT,,, | Performed by: PSYCHIATRY & NEUROLOGY

## 2025-06-19 NOTE — CONSULTS
[facility]  Neurology  Consult Note    Patient Name: Farzad Lockwood  MRN: [id number]  Admission Date: (Not on file)  Hospital Length of Stay: 0 days  Code Status: [unfilled]   Attending Provider: Marilu Alvarez MD  Consulting Provider: José Miguel Berrios MD  Primary Care Physician: Juan M Conti MD  Principal Problem:[unfilled]    Consults  Subjective:     Chief Complaint:  Abnormal EEG    HPI: 41 y/o male admitted to hospital for antiviral treatment. Pt has Hx of muscle spasms, long thoracic nerve palsy who also has begun to experience episodes on which he is loses his train of thought, spaces out with possible cognitive decline. He had MRI brain that showed bilateral globus pallidus hyperintensities and underwent Karie scan with diminished uptake in the area. He tells me that has undergone extensive workup including heavy metals with no definite etiology of his symptoms. Given findings on MRI he was admitted for acyclovir. He denies fever, chills, diarrhea, vomiting. No headaches visual or speech disturbances, weakness/numbness of limbs. No reported seizures, changes in behavior, hallucinations. Mr Lockwood tells me that he has not been to a neurologist yet. No CSF analysis done in the past.    Past Medical History:   Diagnosis Date    ADHD (attention deficit hyperactivity disorder)     Anxiety disorder, unspecified     Bipolar disorder, unspecified     Brachial neuritis     Cervical spinal stenosis     Cervical spondylosis with radiculopathy     GERD (gastroesophageal reflux disease)     Gout, unspecified     Head pain     Hypercholesteremia     Hypothyroidism, unspecified     Insomnia     Neck pain     Neuropathy     Osteopenia     PONV (postoperative nausea and vomiting)     c dizziness       Past Surgical History:   Procedure Laterality Date    APPENDECTOMY  2007    CHOLECYSTECTOMY      COLONOSCOPY      ESOPHAGOGASTRODUODENOSCOPY      LASIK Bilateral     Radioactive iodine-induced  hypothyroidism      RHINOPLASTY      TOTAL REPLACEMENT OF CERVICAL INTERVERTEBRAL DISC N/A 12/20/2022    C5-6, C6-7 TDR.  Dr. Denise       Review of patient's allergies indicates:   Allergen Reactions    Tramadol Hives     Other reaction(s): NIGHTMARES       Current Neurological Medications:     Current Outpatient Medications on File Prior to Visit   Medication Sig    ADDERALL 15 mg tablet Take 15 mg by mouth as needed.    ARMOUR THYROID 120 mg Tab Take 180 mg by mouth Daily.    calcium carbonate/vitamin D3 (CALCIUM+D ORAL) Take 0.5 tablets by mouth 2 (two) times a day. Calcium 1000 mg Vit D 40mcg    clonazePAM (KLONOPIN) 0.5 MG tablet Take 0.5 mg by mouth 2 (two) times daily as needed.    dextroamphetamine-amphetamine (ADDERALL XR) 20 MG 24 hr capsule Take 10-20 mg by mouth as needed.    hydroxychloroquine (PLAQUENIL) 200 mg tablet 200 mg once daily.    lamoTRIgine (LAMICTAL) 200 MG tablet TAKE 1 TABLET BY MOUTH EVERY DAY    LIDOcaine (LIDODERM) 5 % Place 1 patch onto the skin once daily. Remove & Discard patch within 12 hours or as directed by MD    MAGNESIUM CHLORIDE ORAL Take 1 tablet by mouth 2 (two) times a day. Magnesium 80mg    methocarbamoL (ROBAXIN) 500 MG Tab Take 500-1,000 mg by mouth 3 (three) times daily as needed.    omeprazole (PRILOSEC) 40 MG capsule TAKE 1 CAPSULE BY MOUTH EVERY DAY    pregabalin (LYRICA) 75 MG capsule Take 75 mg by mouth 4 (four) times daily as needed.    QUEtiapine (SEROQUEL) 100 MG Tab Take 100 mg by mouth nightly as needed.    tadalafiL (CIALIS) 20 MG Tab Take 20 mg by mouth daily as needed.    tamsulosin (FLOMAX) 0.4 mg Cap TAKE 1 CAPSULE BY MOUTH ONCE DAILY     No current facility-administered medications on file prior to visit.      Family History       Problem Relation (Age of Onset)    Cancer Maternal Grandfather    Hypothyroidism Paternal Grandmother    Mitral valve prolapse Mother, Brother    PONV Mother          Tobacco Use    Smoking status: Never    Smokeless tobacco:  Never   Substance and Sexual Activity    Alcohol use: Never    Drug use: Never    Sexual activity: Not on file     Review of Systems   Constitutional:  Negative for fatigue.   HENT:  Negative for congestion.    Eyes:  Negative for pain.   Respiratory:  Negative for chest tightness.    Cardiovascular:  Negative for chest pain.   Gastrointestinal:  Negative for abdominal pain.   Genitourinary:  Negative for dysuria.   Musculoskeletal:  Negative for gait problem.   Neurological:  Negative for headaches.         Objective:     Vital Signs (Most Recent):    Vital Signs (24h Range):  [unfilled]        There is no height or weight on file to calculate BMI.    Physical Exam  Constitutional:       General: He is not in acute distress.  Pulmonary:      Effort: No respiratory distress.   Neurological:      Mental Status: He is alert and oriented to person, place, and time.      Cranial Nerves: No dysarthria or facial asymmetry.      Motor: No tremor.      Comments: AROM of UE's and LE's against gravity.         NEUROLOGICAL EXAMINATION:     MENTAL STATUS   Oriented to person, place, and time.       Significant Labs: CBC: [unfilled]  CMP: [unfilled]          Significant Imaging: I have reviewed all pertinent imaging results/findings within the past 24 hours.    Assessment and Plan:     39 y/o male with reported bilateral globus pallidus hyperintensity of unknown etiology (images not available to review). This can willian seen in infections, metabolic abnormalities, exposure to toxic substances. [facility] reported to have cerebral dysfunction which can be seen in encephalopathies but finding is nonspecific. On exam no abnormal movements,. Pt is able to answer al questions and follow commands.    If not done:  -Autoimmune encephalitis panel in serum  -LP for cell count differential, protein, glucose, autoimmune encephalitis panel.  -Copper, ceruloplasmin.  -Neurology clinic follow up.      VTE Risk Mitigation (From admission,  onward)      None            Thank you for your consult. I will follow-up with patient. Please contact us if you have any additional questions.    José Miguel Berrios MD  Neurology  [facility]       This is a consult performed through audio-visual using [employer] tang. Pt and provider are in different locations. History and physical exam are limited due to the nature of this encounter.

## 2025-07-29 ENCOUNTER — ANESTHESIA (OUTPATIENT)
Dept: ENDOSCOPY | Facility: HOSPITAL | Age: 40
End: 2025-07-29
Payer: COMMERCIAL

## 2025-07-29 ENCOUNTER — HOSPITAL ENCOUNTER (OUTPATIENT)
Facility: HOSPITAL | Age: 40
Discharge: HOME | End: 2025-07-29
Attending: INTERNAL MEDICINE | Admitting: INTERNAL MEDICINE
Payer: COMMERCIAL

## 2025-07-29 ENCOUNTER — ANESTHESIA EVENT (OUTPATIENT)
Dept: ENDOSCOPY | Facility: HOSPITAL | Age: 40
End: 2025-07-29
Payer: COMMERCIAL

## 2025-07-29 DIAGNOSIS — R11.0 NAUSEA: ICD-10-CM

## 2025-07-29 DIAGNOSIS — K59.00 COLONIC CONSTIPATION: Primary | ICD-10-CM

## 2025-07-29 DIAGNOSIS — K62.5 HEMORRHAGE OF RECTUM AND ANUS: ICD-10-CM

## 2025-07-29 PROCEDURE — 37000008 HC ANESTHESIA 1ST 15 MINUTES: Performed by: INTERNAL MEDICINE

## 2025-07-29 PROCEDURE — 37000009 HC ANESTHESIA EA ADD 15 MINS: Performed by: INTERNAL MEDICINE

## 2025-07-29 PROCEDURE — 63600175 PHARM REV CODE 636 W HCPCS: Performed by: NURSE ANESTHETIST, CERTIFIED REGISTERED

## 2025-07-29 PROCEDURE — 43239 EGD BIOPSY SINGLE/MULTIPLE: CPT | Performed by: INTERNAL MEDICINE

## 2025-07-29 PROCEDURE — 25000003 PHARM REV CODE 250

## 2025-07-29 PROCEDURE — 45378 DIAGNOSTIC COLONOSCOPY: CPT | Performed by: INTERNAL MEDICINE

## 2025-07-29 PROCEDURE — 27201423 OPTIME MED/SURG SUP & DEVICES STERILE SUPPLY: Performed by: INTERNAL MEDICINE

## 2025-07-29 PROCEDURE — 25000003 PHARM REV CODE 250: Performed by: NURSE ANESTHETIST, CERTIFIED REGISTERED

## 2025-07-29 RX ORDER — LIDOCAINE HYDROCHLORIDE 10 MG/ML
INJECTION, SOLUTION EPIDURAL; INFILTRATION; INTRACAUDAL; PERINEURAL
Status: DISCONTINUED | OUTPATIENT
Start: 2025-07-29 | End: 2025-07-29

## 2025-07-29 RX ORDER — LIDOCAINE HYDROCHLORIDE 10 MG/ML
INJECTION, SOLUTION EPIDURAL; INFILTRATION; INTRACAUDAL; PERINEURAL
Status: COMPLETED
Start: 2025-07-29 | End: 2025-07-29

## 2025-07-29 RX ORDER — DEXTROMETHORPHAN/PSEUDOEPHED 2.5-7.5/.8
DROPS ORAL
Status: COMPLETED
Start: 2025-07-29 | End: 2025-07-29

## 2025-07-29 RX ORDER — PROPOFOL 10 MG/ML
VIAL (ML) INTRAVENOUS
Status: COMPLETED
Start: 2025-07-29 | End: 2025-07-29

## 2025-07-29 RX ORDER — ONDANSETRON 4 MG/1
TABLET, ORALLY DISINTEGRATING ORAL
Status: COMPLETED
Start: 2025-07-29 | End: 2025-07-29

## 2025-07-29 RX ORDER — SODIUM CHLORIDE, SODIUM GLUCONATE, SODIUM ACETATE, POTASSIUM CHLORIDE AND MAGNESIUM CHLORIDE 30; 37; 368; 526; 502 MG/100ML; MG/100ML; MG/100ML; MG/100ML; MG/100ML
INJECTION, SOLUTION INTRAVENOUS CONTINUOUS
Status: DISCONTINUED | OUTPATIENT
Start: 2025-07-29 | End: 2025-07-29 | Stop reason: HOSPADM

## 2025-07-29 RX ORDER — GLUCAGON 1 MG
1 KIT INJECTION
OUTPATIENT
Start: 2025-07-29

## 2025-07-29 RX ORDER — PROPOFOL 10 MG/ML
VIAL (ML) INTRAVENOUS
Status: DISCONTINUED
Start: 2025-07-29 | End: 2025-07-29 | Stop reason: HOSPADM

## 2025-07-29 RX ORDER — PROPOFOL 10 MG/ML
VIAL (ML) INTRAVENOUS
Status: DISCONTINUED | OUTPATIENT
Start: 2025-07-29 | End: 2025-07-29

## 2025-07-29 RX ORDER — LIDOCAINE HYDROCHLORIDE 10 MG/ML
1 INJECTION, SOLUTION EPIDURAL; INFILTRATION; INTRACAUDAL; PERINEURAL ONCE
Status: DISCONTINUED | OUTPATIENT
Start: 2025-07-29 | End: 2025-07-29 | Stop reason: HOSPADM

## 2025-07-29 RX ADMIN — PROPOFOL 100 MG: 10 INJECTION, EMULSION INTRAVENOUS at 09:07

## 2025-07-29 RX ADMIN — ONDANSETRON 4 MG: 4 TABLET, ORALLY DISINTEGRATING ORAL at 09:07

## 2025-07-29 RX ADMIN — SODIUM CHLORIDE, SODIUM GLUCONATE, SODIUM ACETATE, POTASSIUM CHLORIDE AND MAGNESIUM CHLORIDE: 526; 502; 368; 37; 30 INJECTION, SOLUTION INTRAVENOUS at 09:07

## 2025-07-29 RX ADMIN — LIDOCAINE HYDROCHLORIDE 80 MG: 10 INJECTION, SOLUTION EPIDURAL; INFILTRATION; INTRACAUDAL; PERINEURAL at 09:07

## 2025-07-29 RX ADMIN — PROPOFOL 175 MCG/KG/MIN: 10 INJECTION, EMULSION INTRAVENOUS at 09:07

## 2025-07-29 NOTE — TRANSFER OF CARE
"Anesthesia Transfer of Care Note    Patient: Farzad Lockwood    Procedure(s) Performed: Procedure(s) (LRB):  EGD (N/A)  COLON (N/A)    Patient location: GI    Anesthesia Type: MAC    Transport from OR: Transported from OR on room air with adequate spontaneous ventilation    Post pain: adequate analgesia    Post assessment: no apparent anesthetic complications    Post vital signs: stable    Level of consciousness: awake    Nausea/Vomiting: no nausea/vomiting    Complications: none    Transfer of care protocol was followed      Last vitals: Visit Vitals  Ht 5' 9" (1.753 m)   Wt 75.8 kg (167 lb)   BMI 24.66 kg/m²     "

## 2025-07-29 NOTE — ANESTHESIA PREPROCEDURE EVALUATION
"                                                                                                             07/29/2025  Farzad Lockwood is a 40 y.o., male.  Diagnosis:      Hemorrhage of rectum and anus [K62.5]      Colonic constipation [K59.00]      Nausea [R11.0]   Location: [facility] ENDO 03 / [facility] ENDOSCOPY   Surgeons: Donald Hardy MD       The pt. comes to [facility] / [facility] / [facility]- endoscopy for the noted procedure under GA/TIVA.  Case: [id number] Date/Time: 07/29/25 0900   Procedures:      DOUBLE (Abdomen)      COLON   Anesthesia type: General/MAC     PMHx:  PONV (postoperative nausea and vomiting)    Other Medical History   GERD (gastroesophageal reflux disease) Gout, unspecified   Brachial neuritis Hypothyroidism, unspecified   ADHD (attention deficit hyperactivity disorder) Neuropathy   Anxiety disorder, unspecified Insomnia   Bipolar disorder, unspecified Hypercholesteremia   Osteopenia Neck pain   Cervical spondylosis with radiculopathy Cervical spinal stenosis   Head pain    Problem List  Current as of 07/29/25 0802  Arthralgia Attention deficit hyperactivity disorder (ADHD)   Bipolar I disorder Cervical spondylosis with myelopathy and radiculopathy   Cervical stenosis of spinal canal Disorder of autonomic nervous system   Erectile dysfunction Gastroesophageal reflux disease   Generalized anxiety disorder Gout   Hypothyroidism IgG4 related disease   Obsessive-compulsive disorder Postherpetic neuralgia   Vitamin D deficiency          PSHx:  Surgical History:  APPENDECTOMY CHOLECYSTECTOMY   Radioactive iodine-induced hypothyroidism RHINOPLASTY   LASIK COLONOSCOPY   ESOPHAGOGASTRODUODENOSCOPY TOTAL REPLACEMENT OF CERVICAL INTERVERTEBRAL DISC         Vital signs:  Height: 5' 9" (1.753 m) (07/25/25) Weight: 75.8 kg (167 lb) (07/25/25)   BMI: 24.7 IBW: 70.7 kg (155 lb 15.1 oz)         Lab Data:  N/A    EKG:  N/A                              Pre-op Assessment    I have reviewed the Patient Summary " Reports.     I have reviewed the Nursing Notes. I have reviewed the NPO Status.   I have reviewed the Medications.     Review of Systems  Anesthesia Hx:  No problems with previous Anesthesia                Social:  Non-Smoker       Hematology/Oncology:  Hematology Normal   Oncology Normal                                   EENT/Dental:  EENT/Dental Normal           Cardiovascular:  Cardiovascular Normal Exercise tolerance: good                     Functional Capacity good / => 4 METS                         Pulmonary:  Pulmonary Normal                       Renal/:  Renal/ Normal                 Hepatic/GI:     GERD         Gerd          Musculoskeletal:  Musculoskeletal Normal                Neurological:    Neuromuscular Disease,  Headaches      Dx of Headaches                         Neuromuscular Disease   Endocrine:   Hypothyroidism       Hypothyroidism          Dermatological:  Skin Normal    Psych:  Psychiatric History                  Physical Exam  General: Alert, Oriented, Well nourished and Cooperative    Airway:  Mallampati: II   Mouth Opening: Normal  TM Distance: Normal  Tongue: Normal  Neck ROM: Normal ROM    Dental:  Intact    Chest/Lungs:  Clear to auscultation, Normal Respiratory Rate    Heart:  Rate: Normal  Rhythm: Regular Rhythm        Anesthesia Plan  Type of Anesthesia, risks & benefits discussed:    Anesthesia Type: Gen Natural Airway  Intra-op Monitoring Plan: Standard ASA Monitors  Induction:  IV  Informed Consent: Informed consent signed with the Patient and all parties understand the risks and agree with anesthesia plan.  All questions answered.   ASA Score: 3  Day of Surgery Review of History & Physical: H&P Update referred to the surgeon/provider.    Ready For Surgery From Anesthesia Perspective.     .

## 2025-07-29 NOTE — DISCHARGE SUMMARY
[facility]  Discharge Note  Short Stay    Procedure(s) (LRB):  EGD (N/A)  COLON (N/A)      OUTCOME: Patient tolerated treatment/procedure well without complication and is now ready for discharge.    DISPOSITION: Home or Self Care    FINAL DIAGNOSIS:  Hemorrhage of rectum and anus    FOLLOWUP: In clinic    DISCHARGE INSTRUCTIONS:    Discharge Procedure Orders   Diet general         Clinical Reference Documents Added to Patient Instructions         Document    COLONOSCOPY DISCHARGE INSTRUCTIONS (ENGLISH)    UPPER ENDOSCOPY (ENGLISH)            TIME SPENT ON DISCHARGE: 7 minutes

## 2025-07-29 NOTE — ANESTHESIA POSTPROCEDURE EVALUATION
Anesthesia Post Evaluation    Patient: Farzad Lockwood    Procedure(s) Performed: Procedure(s) (LRB):  EGD (N/A)  COLON (N/A)    Final Anesthesia Type: general      Patient location during evaluation: PACU  Patient participation: Yes- Able to Participate  Level of consciousness: awake and alert  Post-procedure vital signs: reviewed and stable  Pain management: adequate  Airway patency: patent    PONV status at discharge: No PONV, nausea (controlled) and vomiting (controlled)  Anesthetic complications: no      Cardiovascular status: blood pressure returned to baseline and stable  Respiratory status: unassisted  Hydration status: euvolemic  Follow-up not needed.              Vitals Value Taken Time   BP 96/48 07/29/25 10:54   Temp 36.8 07/29/25 13:07   Pulse 68 07/29/25 10:54   Resp 17 07/29/25 10:54   SpO2 99 % 07/29/25 10:54         No case tracking events are documented in the log.      Pain/Trisha Score: Trisha Score: 10 (7/29/2025 10:46 AM)

## 2025-07-29 NOTE — PROCEDURES
Farzad Lockwood   MRN: [id number]   ADMISSION DATE: 2025  : 1985  AGE: 40 y.o.    DATE OF PROCEDURE:  2025     PROCEDURE:  EGD with biopsy    SURGEON: ELVIS FOX    PREOPERATIVE DIAGNOSIS:  Nausea, low ferritin    POSTOPERATIVE DIAGNOSIS:  Normal EGD.  Biopsies obtained from body and antrum as well as distal duodenum.    HISTORY AND PHYSICAL:  As per preoperative note.      DESCRIPTION OF PROCEDURE:  Informed consent was obtained.  Patient was placed in left lateral position.  Sedation per anesthesia service.  Olympus video gastroscope was introduced into the oral cavity and esophagus was intubated under direct visualization. The scope was carefully advanced to the distal duodenum.  Patient tolerated the procedure well without any complications.    FINDINGS:  Esophagus: Normal mucosa.  GE junction at approximately 40 cm.  No significant inflammation or ulceration noted.    Stomach: Fundus, cardia, body and antrum appeared unremarkable.  Biopsies were obtained from body and antrum to rule out any mucosal abnormality as well as H pylori infection.    Duodenum: Duodenal bulb, 2nd and 3rd portion of the duodenum appeared unremarkable to the extent of exam.    ESTIMATED BLOOD LOSS:  3-4 cc    COMPLICATIONS:  None    RECOMMENDATIONS:  1. Follow up biopsy results.    2. Colonoscopy to follow

## 2025-07-29 NOTE — PROCEDURES
Farzad Lockwood   MRN: [id number]   ADMISSION DATE: 2025  : 1985  AGE: 40 y.o.    PROCEDURE:  Colonoscopy    DATE OF PROCEDURE:  2025     SURGEON: ELVIS FOX    PREOPERATIVE DIAGNOSIS:  History of rectal bleeding, history of constipation    POSTOPERATIVE DIAGNOSIS:  Normal colonoscopy except grade 1-2 internal hemorrhoids.  Preparation was fair.    HISTORY AND PHYSICAL:  As per preoperative note.      DESCRIPTION OF PROCEDURE:  Informed consent was obtained.  Patient was placed in left lateral position.  Sedation per anesthesia service.  Rectal exam was unremarkable.  Olympus video colonoscope was introduced into the rectum and was carefully advanced to cecum.  Quality of the preparation was fair.  Periodic irrigation was done throughout the procedure with fairly good visualization.  Patient tolerated the procedure well without any complications.    FINDINGS:  Cecum, ascending colon, transverse colon, descending colon, sigmoid colon and rectum appeared unremarkable except grade 1-2 internal hemorrhoids noted on withdrawal of the scope.  Periodic irrigation was done throughout the procedure for better visualization.  Terminal ileum was intubated during procedure and appeared to be unremarkable.  Estimated withdrawal time from cecum to rectum was 10 minutes and 18 seconds.    ESTIMATED BLOOD LOSS:  None    COMPLICATIONS:  None    RECOMMENDATIONS:  1. Diet as tolerated.    2. Follow up with me as scheduled.      Addendum: Patient reports protrusion of the hemorrhoids versus rectal prolapse with bowel movements.  We will obtain colorectal surgery evaluation.

## 2025-07-30 LAB — PSYCHE PATHOLOGY RESULT: NORMAL

## 2025-08-01 VITALS
SYSTOLIC BLOOD PRESSURE: 96 MMHG | HEIGHT: 69 IN | BODY MASS INDEX: 24.73 KG/M2 | WEIGHT: 167 LBS | RESPIRATION RATE: 17 BRPM | OXYGEN SATURATION: 99 % | HEART RATE: 68 BPM | DIASTOLIC BLOOD PRESSURE: 48 MMHG

## (undated) DEVICE — GOWN SMARTSLEEVE AAMI LVL4 LG

## (undated) DEVICE — DRAPE OPMI STERILE

## (undated) DEVICE — BLOCK BLOX BITE DENT RIM 54FR

## (undated) DEVICE — DRAPE FLUID WARMER 44X44IN

## (undated) DEVICE — DRESSING TRANS 4X4 TEGADERM

## (undated) DEVICE — DURAPREP SURG SCRUB 26ML

## (undated) DEVICE — ELECTRODE BLADE INSULATED 1 IN

## (undated) DEVICE — GLOVE PROTEXIS PI SYN SURG 7.5

## (undated) DEVICE — DRAPE TOP 53X102IN

## (undated) DEVICE — RESERVOIR JACKSON-PRATT 100CC

## (undated) DEVICE — NDL HYPO 22GX1 1/2 SYR 10ML LL

## (undated) DEVICE — MANIFOLD 4 PORT

## (undated) DEVICE — GLOVE PROTEXIS PI SYN SURG 6.0

## (undated) DEVICE — SOL NACL IRR 1000ML BTL

## (undated) DEVICE — SPNG CHERRY DISECT XRAY DTECT

## (undated) DEVICE — TIP SUCTION YANKAUER

## (undated) DEVICE — DRAPE C-ARM COVER EZ 36X28IN

## (undated) DEVICE — COVER HD BACK TABLE 6FT

## (undated) DEVICE — KIT SURGICAL TURNOVER

## (undated) DEVICE — TUBING SILICON CLR 3/16IN 10FT

## (undated) DEVICE — CONTAINER SPECIMEN SCREW 4OZ

## (undated) DEVICE — KIT SURGICAL COLON .25 1.1OZ

## (undated) DEVICE — CABLE EKG DL RBBN 3 LEAD DISP

## (undated) DEVICE — TRAY CATH FOL SIL URIMTR 16FR

## (undated) DEVICE — DRAIN JACKSON PRATT TRCR 10FR

## (undated) DEVICE — GAUZE SPONGE 4X4 12PLY

## (undated) DEVICE — SOL .9NACL PF 100 ML

## (undated) DEVICE — FORCEP ALLIGATOR 2.8MM W/NDL

## (undated) DEVICE — TUBING IRR BIPOLAR CORD 12FT

## (undated) DEVICE — Device

## (undated) DEVICE — SUT VICRYL PLUS 3-0 SH 18IN

## (undated) DEVICE — GLOVE PROTEXIS PI SYN SURG 6.5

## (undated) DEVICE — DRESSING TEGADERM 4.4X5IN

## (undated) DEVICE — GLOVE PROTEXIS BLUE LATEX 8

## (undated) DEVICE — SYRINGE 0.9% NACL 10MIL PREFIL

## (undated) DEVICE — DRAPE ORTH SPLIT 77X108IN

## (undated) DEVICE — SHEET XLGE DRAPE

## (undated) DEVICE — NDL QUINCKE SPINAL YEL 20GX3IN

## (undated) DEVICE — ADHESIVE DERMABOND ADVANCED

## (undated) DEVICE — GLOVE PROTEXIS BLUE LATEX 7

## (undated) DEVICE — POSITIONER HEAD ADULT

## (undated) DEVICE — ELECTRODE PATIENT RETURN DISP

## (undated) DEVICE — DRAPE C-ARMOR EQUIPMENT COVER

## (undated) DEVICE — MARKER WRITESITE SKIN CHLRAPRP

## (undated) DEVICE — SOL IRRI STRL WATER 1000ML

## (undated) DEVICE — LINER MEDI-VAC PPV FLTR 1500CC

## (undated) DEVICE — SUT STRATAFIX 4-0 30CM PS-2

## (undated) DEVICE — KIT SURGIFLO HEMOSTATIC MATRIX

## (undated) DEVICE — GLOVE PROTEXIS BLUE LATEX 6.5